# Patient Record
Sex: MALE | Race: WHITE | NOT HISPANIC OR LATINO
[De-identification: names, ages, dates, MRNs, and addresses within clinical notes are randomized per-mention and may not be internally consistent; named-entity substitution may affect disease eponyms.]

---

## 2017-09-22 ENCOUNTER — APPOINTMENT (OUTPATIENT)
Dept: OPHTHALMOLOGY | Facility: CLINIC | Age: 72
End: 2017-09-22
Payer: COMMERCIAL

## 2017-09-22 PROBLEM — Z00.00 ENCOUNTER FOR PREVENTIVE HEALTH EXAMINATION: Status: ACTIVE | Noted: 2017-09-22

## 2017-09-22 PROCEDURE — 99203 OFFICE O/P NEW LOW 30 MIN: CPT

## 2017-10-06 ENCOUNTER — APPOINTMENT (OUTPATIENT)
Dept: OPHTHALMOLOGY | Facility: CLINIC | Age: 72
End: 2017-10-06
Payer: COMMERCIAL

## 2017-10-06 PROCEDURE — 92012 INTRM OPH EXAM EST PATIENT: CPT

## 2017-10-20 ENCOUNTER — RX RENEWAL (OUTPATIENT)
Age: 72
End: 2017-10-20

## 2017-10-20 DIAGNOSIS — H10.13 ACUTE ATOPIC CONJUNCTIVITIS, BILATERAL: ICD-10-CM

## 2017-11-30 ENCOUNTER — APPOINTMENT (OUTPATIENT)
Dept: OPHTHALMOLOGY | Facility: CLINIC | Age: 72
End: 2017-11-30
Payer: MEDICARE

## 2017-11-30 PROCEDURE — 92014 COMPRE OPH EXAM EST PT 1/>: CPT

## 2019-02-15 ENCOUNTER — APPOINTMENT (OUTPATIENT)
Dept: SURGERY | Facility: CLINIC | Age: 74
End: 2019-02-15

## 2019-02-19 ENCOUNTER — OUTPATIENT (OUTPATIENT)
Dept: OUTPATIENT SERVICES | Facility: HOSPITAL | Age: 74
LOS: 1 days | End: 2019-02-19
Payer: MEDICARE

## 2019-02-19 ENCOUNTER — APPOINTMENT (OUTPATIENT)
Dept: BARIATRICS | Facility: CLINIC | Age: 74
End: 2019-02-19
Payer: MEDICARE

## 2019-02-19 VITALS
BODY MASS INDEX: 20.11 KG/M2 | HEART RATE: 75 BPM | DIASTOLIC BLOOD PRESSURE: 79 MMHG | OXYGEN SATURATION: 98 % | WEIGHT: 113.5 LBS | SYSTOLIC BLOOD PRESSURE: 167 MMHG | HEIGHT: 63 IN

## 2019-02-19 DIAGNOSIS — Z78.9 OTHER SPECIFIED HEALTH STATUS: ICD-10-CM

## 2019-02-19 DIAGNOSIS — Z82.49 FAMILY HISTORY OF ISCHEMIC HEART DISEASE AND OTHER DISEASES OF THE CIRCULATORY SYSTEM: ICD-10-CM

## 2019-02-19 DIAGNOSIS — Z72.0 TOBACCO USE: ICD-10-CM

## 2019-02-19 DIAGNOSIS — Z86.79 PERSONAL HISTORY OF OTHER DISEASES OF THE CIRCULATORY SYSTEM: ICD-10-CM

## 2019-02-19 DIAGNOSIS — F17.200 NICOTINE DEPENDENCE, UNSPECIFIED, UNCOMPLICATED: ICD-10-CM

## 2019-02-19 DIAGNOSIS — Z01.818 ENCOUNTER FOR OTHER PREPROCEDURAL EXAMINATION: ICD-10-CM

## 2019-02-19 DIAGNOSIS — Z86.59 PERSONAL HISTORY OF OTHER MENTAL AND BEHAVIORAL DISORDERS: ICD-10-CM

## 2019-02-19 DIAGNOSIS — K46.9 UNSPECIFIED ABDOMINAL HERNIA W/OUT OBSTRUCTION OR GANGRENE: ICD-10-CM

## 2019-02-19 DIAGNOSIS — Z86.39 PERSONAL HISTORY OF OTHER ENDOCRINE, NUTRITIONAL AND METABOLIC DISEASE: ICD-10-CM

## 2019-02-19 LAB
ALBUMIN SERPL ELPH-MCNC: 4.9 G/DL — SIGNIFICANT CHANGE UP (ref 3.3–5)
ALP SERPL-CCNC: 147 U/L — HIGH (ref 40–120)
ALT FLD-CCNC: 45 U/L — SIGNIFICANT CHANGE UP (ref 10–45)
ANION GAP SERPL CALC-SCNC: 11 MMOL/L — SIGNIFICANT CHANGE UP (ref 5–17)
APPEARANCE UR: CLEAR — SIGNIFICANT CHANGE UP
APTT BLD: 37 SEC — HIGH (ref 27.5–36.3)
AST SERPL-CCNC: 59 U/L — HIGH (ref 10–40)
BACTERIA # UR AUTO: SIGNIFICANT CHANGE UP /HPF
BASOPHILS # BLD AUTO: 0.03 K/UL — SIGNIFICANT CHANGE UP (ref 0–0.2)
BASOPHILS NFR BLD AUTO: 0.3 % — SIGNIFICANT CHANGE UP (ref 0–2)
BILIRUB SERPL-MCNC: 0.7 MG/DL — SIGNIFICANT CHANGE UP (ref 0.2–1.2)
BILIRUB UR-MCNC: NEGATIVE — SIGNIFICANT CHANGE UP
BUN SERPL-MCNC: 10 MG/DL — SIGNIFICANT CHANGE UP (ref 7–23)
CALCIUM SERPL-MCNC: 9.9 MG/DL — SIGNIFICANT CHANGE UP (ref 8.4–10.5)
CHLORIDE SERPL-SCNC: 100 MMOL/L — SIGNIFICANT CHANGE UP (ref 96–108)
CO2 SERPL-SCNC: 24 MMOL/L — SIGNIFICANT CHANGE UP (ref 22–31)
COLOR SPEC: YELLOW — SIGNIFICANT CHANGE UP
CREAT SERPL-MCNC: 0.65 MG/DL — SIGNIFICANT CHANGE UP (ref 0.5–1.3)
DIFF PNL FLD: ABNORMAL
EOSINOPHIL # BLD AUTO: 0.03 K/UL — SIGNIFICANT CHANGE UP (ref 0–0.5)
EOSINOPHIL NFR BLD AUTO: 0.3 % — SIGNIFICANT CHANGE UP (ref 0–6)
EPI CELLS # UR: SIGNIFICANT CHANGE UP /HPF (ref 0–5)
GLUCOSE SERPL-MCNC: 145 MG/DL — HIGH (ref 70–99)
GLUCOSE UR QL: NEGATIVE — SIGNIFICANT CHANGE UP
HCT VFR BLD CALC: 48.5 % — SIGNIFICANT CHANGE UP (ref 39–50)
HGB BLD-MCNC: 16.3 G/DL — SIGNIFICANT CHANGE UP (ref 13–17)
IMM GRANULOCYTES NFR BLD AUTO: 0.3 % — SIGNIFICANT CHANGE UP (ref 0–1.5)
INR BLD: 0.99 — SIGNIFICANT CHANGE UP (ref 0.88–1.16)
KETONES UR-MCNC: NEGATIVE — SIGNIFICANT CHANGE UP
LEUKOCYTE ESTERASE UR-ACNC: NEGATIVE — SIGNIFICANT CHANGE UP
LYMPHOCYTES # BLD AUTO: 0.93 K/UL — LOW (ref 1–3.3)
LYMPHOCYTES # BLD AUTO: 10 % — LOW (ref 13–44)
MCHC RBC-ENTMCNC: 33.6 GM/DL — SIGNIFICANT CHANGE UP (ref 32–36)
MCHC RBC-ENTMCNC: 34.5 PG — HIGH (ref 27–34)
MCV RBC AUTO: 102.5 FL — HIGH (ref 80–100)
MONOCYTES # BLD AUTO: 0.66 K/UL — SIGNIFICANT CHANGE UP (ref 0–0.9)
MONOCYTES NFR BLD AUTO: 7.1 % — SIGNIFICANT CHANGE UP (ref 2–14)
NEUTROPHILS # BLD AUTO: 7.65 K/UL — HIGH (ref 1.8–7.4)
NEUTROPHILS NFR BLD AUTO: 82 % — HIGH (ref 43–77)
NITRITE UR-MCNC: NEGATIVE — SIGNIFICANT CHANGE UP
NRBC # BLD: 0 /100 WBCS — SIGNIFICANT CHANGE UP (ref 0–0)
PH UR: 5.5 — SIGNIFICANT CHANGE UP (ref 5–8)
PLATELET # BLD AUTO: 123 K/UL — LOW (ref 150–400)
POTASSIUM SERPL-MCNC: 4.5 MMOL/L — SIGNIFICANT CHANGE UP (ref 3.5–5.3)
POTASSIUM SERPL-SCNC: 4.5 MMOL/L — SIGNIFICANT CHANGE UP (ref 3.5–5.3)
PROT SERPL-MCNC: 8.3 G/DL — SIGNIFICANT CHANGE UP (ref 6–8.3)
PROT UR-MCNC: ABNORMAL MG/DL
PROTHROM AB SERPL-ACNC: 11.2 SEC — SIGNIFICANT CHANGE UP (ref 10–12.9)
RBC # BLD: 4.73 M/UL — SIGNIFICANT CHANGE UP (ref 4.2–5.8)
RBC # FLD: 12.6 % — SIGNIFICANT CHANGE UP (ref 10.3–14.5)
RBC CASTS # UR COMP ASSIST: < 5 /HPF — SIGNIFICANT CHANGE UP
SODIUM SERPL-SCNC: 135 MMOL/L — SIGNIFICANT CHANGE UP (ref 135–145)
SP GR SPEC: 1.01 — SIGNIFICANT CHANGE UP (ref 1–1.03)
UROBILINOGEN FLD QL: 0.2 E.U./DL — SIGNIFICANT CHANGE UP
WBC # BLD: 9.33 K/UL — SIGNIFICANT CHANGE UP (ref 3.8–10.5)
WBC # FLD AUTO: 9.33 K/UL — SIGNIFICANT CHANGE UP (ref 3.8–10.5)
WBC UR QL: < 5 /HPF — SIGNIFICANT CHANGE UP

## 2019-02-19 PROCEDURE — 85730 THROMBOPLASTIN TIME PARTIAL: CPT

## 2019-02-19 PROCEDURE — 85610 PROTHROMBIN TIME: CPT

## 2019-02-19 PROCEDURE — 80053 COMPREHEN METABOLIC PANEL: CPT

## 2019-02-19 PROCEDURE — 87086 URINE CULTURE/COLONY COUNT: CPT

## 2019-02-19 PROCEDURE — 71046 X-RAY EXAM CHEST 2 VIEWS: CPT

## 2019-02-19 PROCEDURE — 71046 X-RAY EXAM CHEST 2 VIEWS: CPT | Mod: 26

## 2019-02-19 PROCEDURE — 81001 URINALYSIS AUTO W/SCOPE: CPT

## 2019-02-19 PROCEDURE — 99205 OFFICE O/P NEW HI 60 MIN: CPT

## 2019-02-19 PROCEDURE — 85025 COMPLETE CBC W/AUTO DIFF WBC: CPT

## 2019-02-19 RX ORDER — TOBRAMYCIN AND DEXAMETHASONE 3; 1 MG/ML; MG/ML
0.3-0.1 SUSPENSION/ DROPS OPHTHALMIC
Qty: 5 | Refills: 0 | Status: ACTIVE | COMMUNITY
Start: 2017-10-20

## 2019-02-21 LAB
CULTURE RESULTS: NO GROWTH — SIGNIFICANT CHANGE UP
SPECIMEN SOURCE: SIGNIFICANT CHANGE UP

## 2019-02-26 ENCOUNTER — RESULT REVIEW (OUTPATIENT)
Age: 74
End: 2019-02-26

## 2019-02-26 ENCOUNTER — OUTPATIENT (OUTPATIENT)
Dept: OUTPATIENT SERVICES | Facility: HOSPITAL | Age: 74
LOS: 1 days | Discharge: HOME | End: 2019-02-26
Payer: MEDICARE

## 2019-02-26 ENCOUNTER — APPOINTMENT (OUTPATIENT)
Dept: SURGERY | Facility: CLINIC | Age: 74
End: 2019-02-26

## 2019-02-26 LAB — GLUCOSE BLDC GLUCOMTR-MCNC: 91 MG/DL — SIGNIFICANT CHANGE UP (ref 70–99)

## 2019-02-26 PROCEDURE — 47562 LAPAROSCOPIC CHOLECYSTECTOMY: CPT

## 2019-02-26 RX ORDER — DOCUSATE SODIUM 100 MG
1 CAPSULE ORAL
Qty: 15 | Refills: 0 | OUTPATIENT
Start: 2019-02-26 | End: 2019-03-02

## 2019-03-01 ENCOUNTER — OTHER (OUTPATIENT)
Age: 74
End: 2019-03-01

## 2019-03-01 LAB — SURGICAL PATHOLOGY STUDY: SIGNIFICANT CHANGE UP

## 2019-03-12 ENCOUNTER — APPOINTMENT (OUTPATIENT)
Dept: BARIATRICS | Facility: CLINIC | Age: 74
End: 2019-03-12

## 2019-03-15 VITALS
HEART RATE: 100 BPM | OXYGEN SATURATION: 99 % | RESPIRATION RATE: 17 BRPM | DIASTOLIC BLOOD PRESSURE: 53 MMHG | SYSTOLIC BLOOD PRESSURE: 136 MMHG | TEMPERATURE: 99 F

## 2019-03-15 LAB
ALBUMIN SERPL ELPH-MCNC: 3.5 G/DL — SIGNIFICANT CHANGE UP (ref 3.3–5)
ALP SERPL-CCNC: 259 U/L — HIGH (ref 40–120)
ALT FLD-CCNC: 28 U/L — SIGNIFICANT CHANGE UP (ref 10–45)
ANION GAP SERPL CALC-SCNC: 11 MMOL/L — SIGNIFICANT CHANGE UP (ref 5–17)
AST SERPL-CCNC: 35 U/L — SIGNIFICANT CHANGE UP (ref 10–40)
BASOPHILS # BLD AUTO: 0.06 K/UL — SIGNIFICANT CHANGE UP (ref 0–0.2)
BASOPHILS NFR BLD AUTO: 0.7 % — SIGNIFICANT CHANGE UP (ref 0–2)
BILIRUB SERPL-MCNC: 0.4 MG/DL — SIGNIFICANT CHANGE UP (ref 0.2–1.2)
BUN SERPL-MCNC: 7 MG/DL — SIGNIFICANT CHANGE UP (ref 7–23)
CALCIUM SERPL-MCNC: 8.6 MG/DL — SIGNIFICANT CHANGE UP (ref 8.4–10.5)
CHLORIDE SERPL-SCNC: 99 MMOL/L — SIGNIFICANT CHANGE UP (ref 96–108)
CO2 SERPL-SCNC: 21 MMOL/L — LOW (ref 22–31)
CREAT SERPL-MCNC: 0.59 MG/DL — SIGNIFICANT CHANGE UP (ref 0.5–1.3)
EOSINOPHIL # BLD AUTO: 0.03 K/UL — SIGNIFICANT CHANGE UP (ref 0–0.5)
EOSINOPHIL NFR BLD AUTO: 0.3 % — SIGNIFICANT CHANGE UP (ref 0–6)
GLUCOSE SERPL-MCNC: 97 MG/DL — SIGNIFICANT CHANGE UP (ref 70–99)
HCT VFR BLD CALC: 31.7 % — LOW (ref 39–50)
HGB BLD-MCNC: 10.4 G/DL — LOW (ref 13–17)
IMM GRANULOCYTES NFR BLD AUTO: 1 % — SIGNIFICANT CHANGE UP (ref 0–1.5)
LIDOCAIN IGE QN: 5 U/L — LOW (ref 7–60)
LYMPHOCYTES # BLD AUTO: 0.58 K/UL — LOW (ref 1–3.3)
LYMPHOCYTES # BLD AUTO: 6.6 % — LOW (ref 13–44)
MCHC RBC-ENTMCNC: 32.8 GM/DL — SIGNIFICANT CHANGE UP (ref 32–36)
MCHC RBC-ENTMCNC: 33.9 PG — SIGNIFICANT CHANGE UP (ref 27–34)
MCV RBC AUTO: 103.3 FL — HIGH (ref 80–100)
MONOCYTES # BLD AUTO: 0.49 K/UL — SIGNIFICANT CHANGE UP (ref 0–0.9)
MONOCYTES NFR BLD AUTO: 5.6 % — SIGNIFICANT CHANGE UP (ref 2–14)
NEUTROPHILS # BLD AUTO: 7.53 K/UL — HIGH (ref 1.8–7.4)
NEUTROPHILS NFR BLD AUTO: 85.8 % — HIGH (ref 43–77)
NRBC # BLD: 0 /100 WBCS — SIGNIFICANT CHANGE UP (ref 0–0)
PLATELET # BLD AUTO: 320 K/UL — SIGNIFICANT CHANGE UP (ref 150–400)
POTASSIUM SERPL-MCNC: 4.3 MMOL/L — SIGNIFICANT CHANGE UP (ref 3.5–5.3)
POTASSIUM SERPL-SCNC: 4.3 MMOL/L — SIGNIFICANT CHANGE UP (ref 3.5–5.3)
PROT SERPL-MCNC: 6.1 G/DL — SIGNIFICANT CHANGE UP (ref 6–8.3)
RBC # BLD: 3.07 M/UL — LOW (ref 4.2–5.8)
RBC # FLD: 14.6 % — HIGH (ref 10.3–14.5)
SODIUM SERPL-SCNC: 131 MMOL/L — LOW (ref 135–145)
WBC # BLD: 8.78 K/UL — SIGNIFICANT CHANGE UP (ref 3.8–10.5)
WBC # FLD AUTO: 8.78 K/UL — SIGNIFICANT CHANGE UP (ref 3.8–10.5)

## 2019-03-15 PROCEDURE — 76705 ECHO EXAM OF ABDOMEN: CPT | Mod: 26

## 2019-03-15 RX ORDER — FAMOTIDINE 10 MG/ML
20 INJECTION INTRAVENOUS ONCE
Qty: 0 | Refills: 0 | Status: COMPLETED | OUTPATIENT
Start: 2019-03-15 | End: 2019-03-15

## 2019-03-15 RX ORDER — IOHEXOL 300 MG/ML
30 INJECTION, SOLUTION INTRAVENOUS ONCE
Qty: 0 | Refills: 0 | Status: COMPLETED | OUTPATIENT
Start: 2019-03-15 | End: 2019-03-15

## 2019-03-15 RX ORDER — MORPHINE SULFATE 50 MG/1
4 CAPSULE, EXTENDED RELEASE ORAL ONCE
Qty: 0 | Refills: 0 | Status: DISCONTINUED | OUTPATIENT
Start: 2019-03-15 | End: 2019-03-15

## 2019-03-15 RX ORDER — SODIUM CHLORIDE 9 MG/ML
1000 INJECTION INTRAMUSCULAR; INTRAVENOUS; SUBCUTANEOUS ONCE
Qty: 0 | Refills: 0 | Status: COMPLETED | OUTPATIENT
Start: 2019-03-15 | End: 2019-03-15

## 2019-03-15 RX ADMIN — MORPHINE SULFATE 4 MILLIGRAM(S): 50 CAPSULE, EXTENDED RELEASE ORAL at 19:57

## 2019-03-15 RX ADMIN — FAMOTIDINE 20 MILLIGRAM(S): 10 INJECTION INTRAVENOUS at 19:57

## 2019-03-15 RX ADMIN — SODIUM CHLORIDE 2000 MILLILITER(S): 9 INJECTION INTRAMUSCULAR; INTRAVENOUS; SUBCUTANEOUS at 19:57

## 2019-03-15 RX ADMIN — IOHEXOL 30 MILLILITER(S): 300 INJECTION, SOLUTION INTRAVENOUS at 19:56

## 2019-03-15 NOTE — CONSULT NOTE ADULT - ASSESSMENT
73 Year old, M, PMH of CAD S/p stent, HTN, DM, S/p Lap tila on 2/26 for symptomatic gall stone, present with abdominal pain, Post lap tila, US shows post cholecystectomy changes, CBD 7mm    Recs:  - CT abdomen/Pelvis with IV/oral contrast  - Seen and examined with the chief  - Discussed with   - Will follow

## 2019-03-15 NOTE — CONSULT NOTE ADULT - SUBJECTIVE AND OBJECTIVE BOX
73 Year old, M, PMH of CAD S/p stent, HTN, DM, S/p Lap tila on 2/26 for symptomatic gall stone, present with abdominal pain, mainly epigastric and RUQ, doesn't radiate, he states he has been having the same pain before surgery and it never resolved, associated with nausea/vomiting, he can tolerate food but sometimes he feel he can't tolerate oral diet, he denies fever, no change in bowel habits, no hematuria or dysuria.      Vital Signs Last 24 Hrs  T(C): 37.2 (15 Mar 2019 17:41), Max: 37.2 (15 Mar 2019 17:41)  T(F): 98.9 (15 Mar 2019 17:41), Max: 98.9 (15 Mar 2019 17:41)  HR: 100 (15 Mar 2019 17:41) (100 - 100)  BP: 136/53 (15 Mar 2019 17:41) (136/53 - 136/53)  BP(mean): --  RR: 17 (15 Mar 2019 17:41) (17 - 17)  SpO2: 99% (15 Mar 2019 17:41) (99% - 99%)    Physical Exam:  General: NAD, resting comfortably in bed  Pulmonary: Nonlabored breathing, no respiratory distress  Abdominal:  4 laparoscopic incision well healed and covered by dermabond  Soft, ND,NT      LABS:                        10.4   8.78  )-----------( 320      ( 15 Mar 2019 18:18 )             31.7     03-15    131<L>  |  99  |  7   ----------------------------<  97  4.3   |  21<L>  |  0.59    Ca    8.6      15 Mar 2019 18:18    TPro  6.1  /  Alb  3.5  /  TBili  0.4  /  DBili  x   /  AST  35  /  ALT  28  /  AlkPhos  259<H>  03-15      CAPILLARY BLOOD GLUCOSE          LIVER FUNCTIONS - ( 15 Mar 2019 18:18 )  Alb: 3.5 g/dL / Pro: 6.1 g/dL / ALK PHOS: 259 U/L / ALT: 28 U/L / AST: 35 U/L / GGT: x

## 2019-03-15 NOTE — ED PROVIDER NOTE - CLINICAL SUMMARY MEDICAL DECISION MAKING FREE TEXT BOX
74 yo M with pmh of DM, CAD s/p stent, HTN s/p cholecystectomy 2/26 c/o RUQ pain since surgery. +n/v. No f/c.  Soft; non-distended; +RUQ ttp, + ecchymosis around umbilical wound, wounds healing well without erythema or drainage

## 2019-03-15 NOTE — ED ADULT TRIAGE NOTE - CHIEF COMPLAINT QUOTE
pt c/o left sided flank/rib pain radiating to the sternum. pt had cholecystectomy 2/26 and now reports pain is increasing, bruising noted on L sided of ribs and abdomen. also c/o intermittent vomiting ever since the surgery and inability to tolerate PO. denies fever/chills, drainage at incision sites.

## 2019-03-15 NOTE — ED PROVIDER NOTE - PHYSICAL EXAMINATION
CONSTITUTIONAL: Well-appearing; well-nourished; in no apparent distress.   HEAD: Normocephalic; atraumatic.   EYES: PERRL; EOM intact; conjunctiva and sclera clear  ENT: normal nose; no rhinorrhea; normal pharynx with no erythema or lesions.   NECK: Supple; non-tender; no LAD  CARDIOVASCULAR: Normal S1, S2; no murmurs, rubs, or gallops. Regular rate and rhythm.   RESPIRATORY: Breathing easily; breath sounds clear and equal bilaterally; no wheezes, rhonchi, or rales.  GI: Soft; non-distended; +RUQ ttp, + ecchymosis around umbilical wound, wounds healing well without erythema or drainage   MSK: FROM at all extremities, normal tone   EXT: No cyanosis or edema; N/V intact  SKIN: Normal for age and race; warm; dry; good turgor; no apparent lesions or rash.   NEURO: A & O x 3; face symmetric; grossly unremarkable.   PSYCHOLOGICAL: The patient’s mood and manner are appropriate.

## 2019-03-15 NOTE — ED ADULT NURSE NOTE - OBJECTIVE STATEMENT
Pt presents with L sided rib/flank pain, radiating to sternum. Pt states he had a cholecystectomy on 2/26 and reports increased pain, bruising and decreased PO intake. Pt reports daily n/v. Pt denies fever/chills, CP or SOB. Incision sites on abd are clean dry and intact. Bruising noted on abd. Pt presents with L sided rib/flank pain, radiating to sternum. Pt states he had a cholecystectomy on 2/26 and reports increased pain, bruising and decreased PO intake. Pt reports daily n/v. Pt denies fever/chills, CP or SOB. Incision sites on abd are clean dry and intact. Bruising noted on abd.    Wife [phone]  Son [phone]

## 2019-03-15 NOTE — ED ADULT NURSE REASSESSMENT NOTE - NS ED NURSE REASSESS COMMENT FT1
Rec'd pt  calm, in no distress, breathing with ease on room air. Pt made aware of plan of care. Pt pending CT and US results. Pt needs met. Safety precautions in place. Close monitoring continues.

## 2019-03-15 NOTE — ED PROVIDER NOTE - ATTENDING CONTRIBUTION TO CARE
I discussed the plan of care of the patient directly with the PA while the patient was in the Emergency Department. He is s/p recent lap cholecystectomy w/ dr. srivastava, here w/ RUQ pain since the surgery. thought initially just post op pain, but not improving at all, and with some intermittent n/v and decreased po intake since the surgery. I did not perform a face to face diagnostic evaluation on this patient. I have reviewed the ACP note and agree with the history, exam and plan of care of labs, US, surgical consult and eval.

## 2019-03-15 NOTE — ED PROVIDER NOTE - OBJECTIVE STATEMENT
72 yo M with pmh of DM, CAD s/p stent, HTN s/p cholecystectomy 2/26 c/o RUQ pain since surgery. Pt states the pain has been constant since the surgery but he  thought it would improve which is why he waited to come in. Associated with intermittent n/v and pt having a hard time eating due to nausea and vomiting. Associated with midsternal chest burning and throat burning. Denies fever, chills, cp, sob, back pain, dysuria. Reports chronic cough.

## 2019-03-16 ENCOUNTER — INPATIENT (INPATIENT)
Facility: HOSPITAL | Age: 74
LOS: 1 days | Discharge: HOME | DRG: 392 | End: 2019-03-18
Attending: GENERAL ACUTE CARE HOSPITAL | Admitting: GENERAL ACUTE CARE HOSPITAL
Payer: MEDICARE

## 2019-03-16 DIAGNOSIS — Z90.49 ACQUIRED ABSENCE OF OTHER SPECIFIED PARTS OF DIGESTIVE TRACT: Chronic | ICD-10-CM

## 2019-03-16 LAB
ALBUMIN SERPL ELPH-MCNC: 3.6 G/DL — SIGNIFICANT CHANGE UP (ref 3.3–5)
ALP SERPL-CCNC: 471 U/L — HIGH (ref 40–120)
ALT FLD-CCNC: 43 U/L — SIGNIFICANT CHANGE UP (ref 10–45)
ANION GAP SERPL CALC-SCNC: 10 MMOL/L — SIGNIFICANT CHANGE UP (ref 5–17)
AST SERPL-CCNC: 68 U/L — HIGH (ref 10–40)
BILIRUB SERPL-MCNC: 0.6 MG/DL — SIGNIFICANT CHANGE UP (ref 0.2–1.2)
BUN SERPL-MCNC: 6 MG/DL — LOW (ref 7–23)
CALCIUM SERPL-MCNC: 9 MG/DL — SIGNIFICANT CHANGE UP (ref 8.4–10.5)
CHLORIDE SERPL-SCNC: 99 MMOL/L — SIGNIFICANT CHANGE UP (ref 96–108)
CO2 SERPL-SCNC: 25 MMOL/L — SIGNIFICANT CHANGE UP (ref 22–31)
CREAT SERPL-MCNC: 0.55 MG/DL — SIGNIFICANT CHANGE UP (ref 0.5–1.3)
GLUCOSE BLDC GLUCOMTR-MCNC: 103 MG/DL — HIGH (ref 70–99)
GLUCOSE BLDC GLUCOMTR-MCNC: 444 MG/DL — HIGH (ref 70–99)
GLUCOSE BLDC GLUCOMTR-MCNC: 479 MG/DL — CRITICAL HIGH (ref 70–99)
GLUCOSE BLDC GLUCOMTR-MCNC: 50 MG/DL — LOW (ref 70–99)
GLUCOSE BLDC GLUCOMTR-MCNC: 533 MG/DL — CRITICAL HIGH (ref 70–99)
GLUCOSE BLDC GLUCOMTR-MCNC: 535 MG/DL — CRITICAL HIGH (ref 70–99)
GLUCOSE BLDC GLUCOMTR-MCNC: 90 MG/DL — SIGNIFICANT CHANGE UP (ref 70–99)
GLUCOSE BLDC GLUCOMTR-MCNC: >600 MG/DL — CRITICAL HIGH (ref 70–99)
GLUCOSE SERPL-MCNC: 47 MG/DL — LOW (ref 70–99)
HBA1C BLD-MCNC: 7.8 % — HIGH (ref 4–5.6)
HCT VFR BLD CALC: 36.9 % — LOW (ref 39–50)
HGB BLD-MCNC: 11.8 G/DL — LOW (ref 13–17)
MAGNESIUM SERPL-MCNC: 1.6 MG/DL — SIGNIFICANT CHANGE UP (ref 1.6–2.6)
MCHC RBC-ENTMCNC: 32 GM/DL — SIGNIFICANT CHANGE UP (ref 32–36)
MCHC RBC-ENTMCNC: 33.6 PG — SIGNIFICANT CHANGE UP (ref 27–34)
MCV RBC AUTO: 105.1 FL — HIGH (ref 80–100)
NRBC # BLD: 0 /100 WBCS — SIGNIFICANT CHANGE UP (ref 0–0)
PHOSPHATE SERPL-MCNC: 3.2 MG/DL — SIGNIFICANT CHANGE UP (ref 2.5–4.5)
PLATELET # BLD AUTO: 303 K/UL — SIGNIFICANT CHANGE UP (ref 150–400)
POTASSIUM SERPL-MCNC: 4.5 MMOL/L — SIGNIFICANT CHANGE UP (ref 3.5–5.3)
POTASSIUM SERPL-SCNC: 4.5 MMOL/L — SIGNIFICANT CHANGE UP (ref 3.5–5.3)
PROT SERPL-MCNC: 6.6 G/DL — SIGNIFICANT CHANGE UP (ref 6–8.3)
RBC # BLD: 3.51 M/UL — LOW (ref 4.2–5.8)
RBC # FLD: 14.7 % — HIGH (ref 10.3–14.5)
SODIUM SERPL-SCNC: 134 MMOL/L — LOW (ref 135–145)
WBC # BLD: 4.92 K/UL — SIGNIFICANT CHANGE UP (ref 3.8–10.5)
WBC # FLD AUTO: 4.92 K/UL — SIGNIFICANT CHANGE UP (ref 3.8–10.5)

## 2019-03-16 PROCEDURE — 74178 CT ABD&PLV WO CNTR FLWD CNTR: CPT | Mod: 26

## 2019-03-16 PROCEDURE — 99285 EMERGENCY DEPT VISIT HI MDM: CPT

## 2019-03-16 RX ORDER — SODIUM CHLORIDE 9 MG/ML
1000 INJECTION, SOLUTION INTRAVENOUS
Qty: 0 | Refills: 0 | Status: DISCONTINUED | OUTPATIENT
Start: 2019-03-16 | End: 2019-03-18

## 2019-03-16 RX ORDER — NICOTINE POLACRILEX 2 MG
1 GUM BUCCAL DAILY
Qty: 0 | Refills: 0 | Status: DISCONTINUED | OUTPATIENT
Start: 2019-03-16 | End: 2019-03-18

## 2019-03-16 RX ORDER — SODIUM CHLORIDE 9 MG/ML
1000 INJECTION, SOLUTION INTRAVENOUS
Qty: 0 | Refills: 0 | Status: DISCONTINUED | OUTPATIENT
Start: 2019-03-16 | End: 2019-03-16

## 2019-03-16 RX ORDER — INSULIN LISPRO 100/ML
VIAL (ML) SUBCUTANEOUS
Qty: 0 | Refills: 0 | Status: DISCONTINUED | OUTPATIENT
Start: 2019-03-16 | End: 2019-03-16

## 2019-03-16 RX ORDER — INSULIN GLARGINE 100 [IU]/ML
0 INJECTION, SOLUTION SUBCUTANEOUS
Qty: 0 | Refills: 0 | COMMUNITY

## 2019-03-16 RX ORDER — DEXTROSE 50 % IN WATER 50 %
12.5 SYRINGE (ML) INTRAVENOUS ONCE
Qty: 0 | Refills: 0 | Status: DISCONTINUED | OUTPATIENT
Start: 2019-03-16 | End: 2019-03-18

## 2019-03-16 RX ORDER — GLUCAGON INJECTION, SOLUTION 0.5 MG/.1ML
1 INJECTION, SOLUTION SUBCUTANEOUS ONCE
Qty: 0 | Refills: 0 | Status: DISCONTINUED | OUTPATIENT
Start: 2019-03-16 | End: 2019-03-18

## 2019-03-16 RX ORDER — OMEPRAZOLE 10 MG/1
1 CAPSULE, DELAYED RELEASE ORAL
Qty: 0 | Refills: 0 | COMMUNITY

## 2019-03-16 RX ORDER — HEPARIN SODIUM 5000 [USP'U]/ML
5000 INJECTION INTRAVENOUS; SUBCUTANEOUS EVERY 8 HOURS
Qty: 0 | Refills: 0 | Status: DISCONTINUED | OUTPATIENT
Start: 2019-03-16 | End: 2019-03-16

## 2019-03-16 RX ORDER — MAGNESIUM SULFATE 500 MG/ML
2 VIAL (ML) INJECTION ONCE
Qty: 0 | Refills: 0 | Status: COMPLETED | OUTPATIENT
Start: 2019-03-16 | End: 2019-03-16

## 2019-03-16 RX ORDER — BENZOCAINE AND MENTHOL 5; 1 G/100ML; G/100ML
1 LIQUID ORAL ONCE
Qty: 0 | Refills: 0 | Status: COMPLETED | OUTPATIENT
Start: 2019-03-16 | End: 2019-03-16

## 2019-03-16 RX ORDER — RAMIPRIL 5 MG
0 CAPSULE ORAL
Qty: 0 | Refills: 0 | COMMUNITY

## 2019-03-16 RX ORDER — INSULIN LISPRO 100/ML
VIAL (ML) SUBCUTANEOUS
Qty: 0 | Refills: 0 | Status: DISCONTINUED | OUTPATIENT
Start: 2019-03-16 | End: 2019-03-17

## 2019-03-16 RX ORDER — DEXTROSE 50 % IN WATER 50 %
12.5 SYRINGE (ML) INTRAVENOUS ONCE
Qty: 0 | Refills: 0 | Status: COMPLETED | OUTPATIENT
Start: 2019-03-16 | End: 2019-03-16

## 2019-03-16 RX ORDER — DEXTROSE 50 % IN WATER 50 %
50 SYRINGE (ML) INTRAVENOUS ONCE
Qty: 0 | Refills: 0 | Status: COMPLETED | OUTPATIENT
Start: 2019-03-16 | End: 2019-03-16

## 2019-03-16 RX ORDER — DEXTROSE 50 % IN WATER 50 %
15 SYRINGE (ML) INTRAVENOUS ONCE
Qty: 0 | Refills: 0 | Status: DISCONTINUED | OUTPATIENT
Start: 2019-03-16 | End: 2019-03-18

## 2019-03-16 RX ORDER — ATORVASTATIN CALCIUM 80 MG/1
1 TABLET, FILM COATED ORAL
Qty: 0 | Refills: 0 | COMMUNITY

## 2019-03-16 RX ORDER — METOPROLOL TARTRATE 50 MG
0 TABLET ORAL
Qty: 0 | Refills: 0 | COMMUNITY

## 2019-03-16 RX ORDER — INSULIN GLARGINE 100 [IU]/ML
100 INJECTION, SOLUTION SUBCUTANEOUS AT BEDTIME
Qty: 0 | Refills: 0 | Status: DISCONTINUED | OUTPATIENT
Start: 2019-03-16 | End: 2019-03-16

## 2019-03-16 RX ORDER — INSULIN GLARGINE 100 [IU]/ML
10 INJECTION, SOLUTION SUBCUTANEOUS AT BEDTIME
Qty: 0 | Refills: 0 | Status: DISCONTINUED | OUTPATIENT
Start: 2019-03-16 | End: 2019-03-18

## 2019-03-16 RX ORDER — HEPARIN SODIUM 5000 [USP'U]/ML
5000 INJECTION INTRAVENOUS; SUBCUTANEOUS EVERY 8 HOURS
Qty: 0 | Refills: 0 | Status: DISCONTINUED | OUTPATIENT
Start: 2019-03-16 | End: 2019-03-18

## 2019-03-16 RX ORDER — DOCUSATE SODIUM 100 MG
100 CAPSULE ORAL THREE TIMES A DAY
Qty: 0 | Refills: 0 | Status: DISCONTINUED | OUTPATIENT
Start: 2019-03-16 | End: 2019-03-18

## 2019-03-16 RX ORDER — ONDANSETRON 8 MG/1
4 TABLET, FILM COATED ORAL EVERY 6 HOURS
Qty: 0 | Refills: 0 | Status: DISCONTINUED | OUTPATIENT
Start: 2019-03-16 | End: 2019-03-18

## 2019-03-16 RX ORDER — DEXTROSE 50 % IN WATER 50 %
25 SYRINGE (ML) INTRAVENOUS ONCE
Qty: 0 | Refills: 0 | Status: DISCONTINUED | OUTPATIENT
Start: 2019-03-16 | End: 2019-03-18

## 2019-03-16 RX ORDER — INSULIN GLARGINE 100 [IU]/ML
50 INJECTION, SOLUTION SUBCUTANEOUS ONCE
Qty: 0 | Refills: 0 | Status: DISCONTINUED | OUTPATIENT
Start: 2019-03-16 | End: 2019-03-16

## 2019-03-16 RX ORDER — ASPIRIN/CALCIUM CARB/MAGNESIUM 324 MG
1 TABLET ORAL
Qty: 0 | Refills: 0 | COMMUNITY

## 2019-03-16 RX ORDER — SENNA PLUS 8.6 MG/1
2 TABLET ORAL AT BEDTIME
Qty: 0 | Refills: 0 | Status: DISCONTINUED | OUTPATIENT
Start: 2019-03-16 | End: 2019-03-18

## 2019-03-16 RX ADMIN — Medication 50 MILLILITER(S): at 01:27

## 2019-03-16 RX ADMIN — Medication 12.5 GRAM(S): at 08:04

## 2019-03-16 RX ADMIN — Medication 100 GRAM(S): at 13:26

## 2019-03-16 RX ADMIN — HEPARIN SODIUM 5000 UNIT(S): 5000 INJECTION INTRAVENOUS; SUBCUTANEOUS at 21:01

## 2019-03-16 RX ADMIN — Medication 1 PATCH: at 13:58

## 2019-03-16 RX ADMIN — Medication 1 PATCH: at 19:02

## 2019-03-16 RX ADMIN — HEPARIN SODIUM 5000 UNIT(S): 5000 INJECTION INTRAVENOUS; SUBCUTANEOUS at 06:51

## 2019-03-16 RX ADMIN — SODIUM CHLORIDE 120 MILLILITER(S): 9 INJECTION, SOLUTION INTRAVENOUS at 01:59

## 2019-03-16 RX ADMIN — BENZOCAINE AND MENTHOL 1 LOZENGE: 5; 1 LIQUID ORAL at 13:26

## 2019-03-16 RX ADMIN — MORPHINE SULFATE 4 MILLIGRAM(S): 50 CAPSULE, EXTENDED RELEASE ORAL at 01:26

## 2019-03-16 RX ADMIN — HEPARIN SODIUM 5000 UNIT(S): 5000 INJECTION INTRAVENOUS; SUBCUTANEOUS at 13:26

## 2019-03-16 NOTE — CHART NOTE - NSCHARTNOTEFT_GEN_A_CORE
PROCEDURE NOTE    Procedure: incision and drainage of hematoma  Resident: [place]  Attending: Sakshi    Patient positioned in supine position. Site prepped with chlorhexidine. #11 blade scalpel was used for 5mm incision to the right lateral port site over site of obvious swelling. Drained approximately 3-5cc of hematoma from site. No packing was placed. Dressed with 4x4 and paper tape. No complications.

## 2019-03-16 NOTE — PROGRESS NOTE ADULT - ASSESSMENT
73M PMH CAD S/p stent, HTN, DM, s/p Lap tila on 2/26 for symptomatic gallstone, a/w abdominal pain and fluid collection in RUQ.     pain/nausea control  IS/OOB  NPO, IVF  ISS  SCDs, HSQ 73M PMH CAD S/p stent, HTN, DM, s/p Lap tila on 2/26 for symptomatic gallstone, a/w abdominal pain and fluid collection in RUQ.     pain/nausea control  IS/OOB  NPO, IVF; advance to low fat diet and monitor tolerance  ISS  SCDs, HSQ  MRCP to evaluate fluid collection  consult GI for recommendations

## 2019-03-16 NOTE — H&P ADULT - ASSESSMENT
73 Year old, M, PMH of CAD S/p stent, HTN, DM, S/p Lap tila on 2/26 for symptomatic gall stone, present with abdominal pain, Post lap tila, US shows post cholecystectomy changes, CBD 7mm. Prelim CT read as normal post operative changes with L bowel containing inguinal hernia that contrast passes    -Admit to [facility] Dr Cantor  -Nausea control PRn, Holding Pain control  -IVF  -NPO  -ISS  -No ABX  -MRCP in am  -SCD, SQH, OOB  -Pt seen by chief resident, Discussed with Dr Cantor and Chief Resident

## 2019-03-16 NOTE — PROGRESS NOTE ADULT - SUBJECTIVE AND OBJECTIVE BOX
SUBJECTIVE: This morning, he feels well; his pain is well-controlled. No nausea or vomiting. No acute complaints.    heparin  Injectable 5000 Unit(s) SubCutaneous every 8 hours      Vital Signs Last 24 Hrs  T(C): 36.9 (16 Mar 2019 05:44), Max: 37.2 (15 Mar 2019 17:41)  T(F): 98.4 (16 Mar 2019 05:44), Max: 98.9 (15 Mar 2019 17:41)  HR: 75 (16 Mar 2019 05:44) (75 - 100)  BP: 113/72 (16 Mar 2019 05:44) (113/72 - 160/83)  BP(mean): --  RR: 17 (16 Mar 2019 05:44) (17 - 18)  SpO2: 100% (16 Mar 2019 05:44) (98% - 100%)  I&O's Detail    15 Mar 2019 07:01  -  16 Mar 2019 07:00  --------------------------------------------------------  IN:    lactated ringers.: 720 mL  Total IN: 720 mL    OUT:    Voided: 500 mL  Total OUT: 500 mL    Total NET: 220 mL      16 Mar 2019 07:01  -  16 Mar 2019 08:33  --------------------------------------------------------  IN:    lactated ringers.: 120 mL  Total IN: 120 mL    OUT:    Voided: 400 mL  Total OUT: 400 mL    Total NET: -280 mL          General: NAD, resting comfortably in bed  Pulm: Nonlabored breathing, no respiratory distress  Abd: soft, mild RUQ tenderness, palpable nodule in RUQ, nondistended  Extrem: WWP, no edema, SCDs in place, no calf tenderness        LABS:                        10.4   8.78  )-----------( 320      ( 15 Mar 2019 18:18 )             31.7     03-15    131<L>  |  99  |  7   ----------------------------<  97  4.3   |  21<L>  |  0.59    Ca    8.6      15 Mar 2019 18:18    TPro  6.1  /  Alb  3.5  /  TBili  0.4  /  DBili  x   /  AST  35  /  ALT  28  /  AlkPhos  259<H>  03-15

## 2019-03-16 NOTE — H&P ADULT - HISTORY OF PRESENT ILLNESS
73 Year old, M, PMH of CAD S/p stent, HTN, DM, S/p Lap tila on 2/26 for symptomatic gall stone, present with abdominal pain, mainly epigastric and RUQ, doesn't radiate, he states he has been having the same pain before surgery and it never resolved, associated with nausea/vomiting, he can tolerate food but sometimes he feel he can't tolerate oral diet, he denies fever, no change in bowel habits, no hematuria or dysuria.

## 2019-03-16 NOTE — H&P ADULT - NSHPLABSRESULTS_GEN_ALL_CORE
10.4   8.78  )-----------( 320      ( 15 Mar 2019 18:18 )             31.7   03-15    131<L>  |  99  |  7   ----------------------------<  97  4.3   |  21<L>  |  0.59    Ca    8.6      15 Mar 2019 18:18    TPro  6.1  /  Alb  3.5  /  TBili  0.4  /  DBili  x   /  AST  35  /  ALT  28  /  AlkPhos  259<H>  03-15  < from: US Abdomen Limited (03.15.19 @ 21:56) >    FINDINGS:     Liver: Increased echogenicity with no visible focal abnormality.    Intrahepatic ducts: Dilated, which could be related to cholecystectomy.    Common bile duct: Normal diameter, measuring 0.7 cm.    Gallbladder: Post cholecystectomy.    Pancreas: The visualized portions were normal in appearance.      Abdominal aorta: No abdominal aortic aneurysm is seen.    Inferior vena cava: The visualized portions were normal in appearance.    Right kidney: Length of 10.3 cm. Pelvic fullness versus mild   hydronephrosis. Normal echogenicity. No focal lesions.       IMPRESSION:  Post cholecystectomy. No dilation of the CBD. Intrahepatic biliary   dilatation, which could be related to cholecystectomy.    Right kidney pelvic fullness versus mild hydronephrosis.    < end of copied text >

## 2019-03-16 NOTE — H&P ADULT - NSHPPHYSICALEXAM_GEN_ALL_CORE
Vital Signs Last 24 Hrs  T(C): 36.9 (15 Mar 2019 23:47), Max: 37.2 (15 Mar 2019 17:41)  T(F): 98.4 (15 Mar 2019 23:47), Max: 98.9 (15 Mar 2019 17:41)  HR: 85 (15 Mar 2019 23:47) (85 - 100)  BP: 152/75 (15 Mar 2019 23:47) (136/53 - 152/75)  BP(mean): --  RR: 17 (15 Mar 2019 23:47) (17 - 17)  SpO2: 98% (15 Mar 2019 23:47) (98% - 99%)    General: NAD, resting comfortably in bed  Pulmonary: Nonlabored breathing, no respiratory distress  Abdominal:  4 laparoscopic incision well healed and covered by dermabond  Soft, ND,NT

## 2019-03-17 DIAGNOSIS — B35.1 TINEA UNGUIUM: ICD-10-CM

## 2019-03-17 DIAGNOSIS — E11.9 TYPE 2 DIABETES MELLITUS WITHOUT COMPLICATIONS: ICD-10-CM

## 2019-03-17 DIAGNOSIS — R13.10 DYSPHAGIA, UNSPECIFIED: ICD-10-CM

## 2019-03-17 LAB
ALBUMIN SERPL ELPH-MCNC: 2.5 G/DL — LOW (ref 3.3–5)
ALP SERPL-CCNC: 351 U/L — HIGH (ref 40–120)
ALT FLD-CCNC: 52 U/L — HIGH (ref 10–45)
ANION GAP SERPL CALC-SCNC: 7 MMOL/L — SIGNIFICANT CHANGE UP (ref 5–17)
AST SERPL-CCNC: 107 U/L — HIGH (ref 10–40)
BILIRUB SERPL-MCNC: 0.3 MG/DL — SIGNIFICANT CHANGE UP (ref 0.2–1.2)
BUN SERPL-MCNC: 13 MG/DL — SIGNIFICANT CHANGE UP (ref 7–23)
CALCIUM SERPL-MCNC: 7.8 MG/DL — LOW (ref 8.4–10.5)
CHLORIDE SERPL-SCNC: 100 MMOL/L — SIGNIFICANT CHANGE UP (ref 96–108)
CO2 SERPL-SCNC: 23 MMOL/L — SIGNIFICANT CHANGE UP (ref 22–31)
CREAT SERPL-MCNC: 0.58 MG/DL — SIGNIFICANT CHANGE UP (ref 0.5–1.3)
GGT SERPL-CCNC: 395 U/L — HIGH (ref 9–50)
GLUCOSE BLDC GLUCOMTR-MCNC: 103 MG/DL — HIGH (ref 70–99)
GLUCOSE BLDC GLUCOMTR-MCNC: 208 MG/DL — HIGH (ref 70–99)
GLUCOSE BLDC GLUCOMTR-MCNC: 213 MG/DL — HIGH (ref 70–99)
GLUCOSE BLDC GLUCOMTR-MCNC: 237 MG/DL — HIGH (ref 70–99)
GLUCOSE BLDC GLUCOMTR-MCNC: 336 MG/DL — HIGH (ref 70–99)
GLUCOSE BLDC GLUCOMTR-MCNC: 397 MG/DL — HIGH (ref 70–99)
GLUCOSE BLDC GLUCOMTR-MCNC: 67 MG/DL — LOW (ref 70–99)
GLUCOSE BLDC GLUCOMTR-MCNC: 70 MG/DL — SIGNIFICANT CHANGE UP (ref 70–99)
GLUCOSE SERPL-MCNC: 92 MG/DL — SIGNIFICANT CHANGE UP (ref 70–99)
HCT VFR BLD CALC: 25.8 % — LOW (ref 39–50)
HGB BLD-MCNC: 8.5 G/DL — LOW (ref 13–17)
MAGNESIUM SERPL-MCNC: 1.4 MG/DL — LOW (ref 1.6–2.6)
MCHC RBC-ENTMCNC: 32.9 GM/DL — SIGNIFICANT CHANGE UP (ref 32–36)
MCHC RBC-ENTMCNC: 34.4 PG — HIGH (ref 27–34)
MCV RBC AUTO: 104.5 FL — HIGH (ref 80–100)
NRBC # BLD: 0 /100 WBCS — SIGNIFICANT CHANGE UP (ref 0–0)
PHOSPHATE SERPL-MCNC: 2.9 MG/DL — SIGNIFICANT CHANGE UP (ref 2.5–4.5)
PLATELET # BLD AUTO: 217 K/UL — SIGNIFICANT CHANGE UP (ref 150–400)
POTASSIUM SERPL-MCNC: 4 MMOL/L — SIGNIFICANT CHANGE UP (ref 3.5–5.3)
POTASSIUM SERPL-SCNC: 4 MMOL/L — SIGNIFICANT CHANGE UP (ref 3.5–5.3)
PROT SERPL-MCNC: 4.7 G/DL — LOW (ref 6–8.3)
RBC # BLD: 2.47 M/UL — LOW (ref 4.2–5.8)
RBC # FLD: 14.4 % — SIGNIFICANT CHANGE UP (ref 10.3–14.5)
SODIUM SERPL-SCNC: 130 MMOL/L — LOW (ref 135–145)
WBC # BLD: 5.23 K/UL — SIGNIFICANT CHANGE UP (ref 3.8–10.5)
WBC # FLD AUTO: 5.23 K/UL — SIGNIFICANT CHANGE UP (ref 3.8–10.5)

## 2019-03-17 PROCEDURE — 99222 1ST HOSP IP/OBS MODERATE 55: CPT | Mod: GC

## 2019-03-17 RX ORDER — INSULIN LISPRO 100/ML
VIAL (ML) SUBCUTANEOUS
Qty: 0 | Refills: 0 | Status: DISCONTINUED | OUTPATIENT
Start: 2019-03-17 | End: 2019-03-18

## 2019-03-17 RX ORDER — MAGNESIUM SULFATE 500 MG/ML
2 VIAL (ML) INJECTION
Qty: 0 | Refills: 0 | Status: COMPLETED | OUTPATIENT
Start: 2019-03-17 | End: 2019-03-17

## 2019-03-17 RX ORDER — FAMOTIDINE 10 MG/ML
20 INJECTION INTRAVENOUS ONCE
Qty: 0 | Refills: 0 | Status: COMPLETED | OUTPATIENT
Start: 2019-03-17 | End: 2019-03-17

## 2019-03-17 RX ADMIN — INSULIN GLARGINE 10 UNIT(S): 100 INJECTION, SOLUTION SUBCUTANEOUS at 00:12

## 2019-03-17 RX ADMIN — Medication 1 PATCH: at 07:18

## 2019-03-17 RX ADMIN — HEPARIN SODIUM 5000 UNIT(S): 5000 INJECTION INTRAVENOUS; SUBCUTANEOUS at 21:22

## 2019-03-17 RX ADMIN — FAMOTIDINE 20 MILLIGRAM(S): 10 INJECTION INTRAVENOUS at 21:22

## 2019-03-17 RX ADMIN — Medication 1 PATCH: at 11:46

## 2019-03-17 RX ADMIN — Medication 10: at 00:57

## 2019-03-17 RX ADMIN — Medication 4: at 22:31

## 2019-03-17 RX ADMIN — HEPARIN SODIUM 5000 UNIT(S): 5000 INJECTION INTRAVENOUS; SUBCUTANEOUS at 13:58

## 2019-03-17 RX ADMIN — Medication 4: at 02:07

## 2019-03-17 RX ADMIN — Medication 8: at 12:31

## 2019-03-17 RX ADMIN — HEPARIN SODIUM 5000 UNIT(S): 5000 INJECTION INTRAVENOUS; SUBCUTANEOUS at 05:05

## 2019-03-17 RX ADMIN — Medication 4: at 17:08

## 2019-03-17 RX ADMIN — INSULIN GLARGINE 10 UNIT(S): 100 INJECTION, SOLUTION SUBCUTANEOUS at 21:22

## 2019-03-17 RX ADMIN — SODIUM CHLORIDE 90 MILLILITER(S): 9 INJECTION, SOLUTION INTRAVENOUS at 11:46

## 2019-03-17 RX ADMIN — Medication 50 GRAM(S): at 16:32

## 2019-03-17 RX ADMIN — Medication 1 PATCH: at 13:25

## 2019-03-17 RX ADMIN — Medication 50 GRAM(S): at 13:58

## 2019-03-17 NOTE — CONSULT NOTE ADULT - SUBJECTIVE AND OBJECTIVE BOX
HPI:  73 Year old, M, PMH of CAD S/p stent, HTN, DM, S/p Lap tila on 2/26 for symptomatic gall stone, present with abdominal pain, mainly epigastric and RUQ, doesn't radiate, he states he has been having the same pain before surgery and it never resolved, associated with nausea/vomiting, he can tolerate food but sometimes he feel he can't tolerate oral diet, he denies fever, no change in bowel habits, no hematuria or dysuria. (16 Mar 2019 01:45)    Allergies    No Known Allergies    Intolerances      Home Medications:  aspirin 81 mg oral tablet, chewable: 1 tab(s) orally once a day (16 Mar 2019 23:37)  atorvastatin 10 mg oral tablet: 1 tab(s) orally once a day (16 Mar 2019 23:37)  Lantus: 10 unit(s) subcutaneous once a day (at bedtime) (16 Mar 2019 23:37)  metFORMIN:  (16 Mar 2019 23:37)  metoprolol tartrate 25 mg oral tablet:  (16 Mar 2019 23:37)  omeprazole 20 mg oral delayed release capsule: 1 cap(s) orally once a day (16 Mar 2019 23:37)  ramipril:  (16 Mar 2019 23:37)    MEDICATIONS:  MEDICATIONS  (STANDING):  dextrose 5%. 1000 milliLiter(s) (50 mL/Hr) IV Continuous <Continuous>  dextrose 50% Injectable 12.5 Gram(s) IV Push once  dextrose 50% Injectable 25 Gram(s) IV Push once  dextrose 50% Injectable 25 Gram(s) IV Push once  heparin  Injectable 5000 Unit(s) SubCutaneous every 8 hours  insulin glargine Injectable (LANTUS) 10 Unit(s) SubCutaneous at bedtime  insulin lispro (HumaLOG) corrective regimen sliding scale   SubCutaneous every 2 hours  lactated ringers. 1000 milliLiter(s) (90 mL/Hr) IV Continuous <Continuous>  nicotine - 21 mG/24Hr(s) Patch 1 patch Transdermal daily    MEDICATIONS  (PRN):  dextrose 40% Gel 15 Gram(s) Oral once PRN Blood Glucose LESS THAN 70 milliGRAM(s)/deciliter  docusate sodium 100 milliGRAM(s) Oral three times a day PRN Constipation  glucagon  Injectable 1 milliGRAM(s) IntraMuscular once PRN Glucose LESS THAN 70 milligrams/deciliter  ondansetron Injectable 4 milliGRAM(s) IV Push every 6 hours PRN Nausea and/or Vomiting  senna 2 Tablet(s) Oral at bedtime PRN Constipation    PAST MEDICAL & SURGICAL HISTORY:  Pancreatitis  Diabetes  Emphysema of lung  History of cholecystectomy    FAMILY HISTORY:    SOCIAL HISTORY:  [place]: [ ] Current, [ ] Former, [ ] Never; Pack Years:  Alcohol:  Illicit Drugs:    REVIEW OF SYSTEMS:  CONSTITUTIONAL: No weakness, fevers or chills  HEENT: No visual changes; No vertigo or throat pain   NECK: No pain or stiffness  RESPIRATORY: No cough, wheezing, hemoptysis; No shortness of breath  CARDIOVASCULAR: No chest pain or palpitations  GASTROINTESTINAL: No abdominal or epigastric pain. No nausea, vomiting, or hematemesis; No diarrhea or constipation. No melena or hematochezia.  GENITOURINARY: No dysuria, frequency or hematuria  NEUROLOGICAL: No numbness or weakness  SKIN: No itching, burning, rashes, or lesions   All other 10 review of systems is negative unless indicated above.    Vital Signs Last 24 Hrs  T(C): 37.1 (17 Mar 2019 09:26), Max: 37.2 (17 Mar 2019 05:09)  T(F): 98.8 (17 Mar 2019 09:26), Max: 99 (17 Mar 2019 05:09)  HR: 98 (17 Mar 2019 09:26) (86 - 98)  BP: 128/74 (17 Mar 2019 09:26) (112/66 - 144/71)  BP(mean): --  RR: 18 (17 Mar 2019 09:26) (17 - 18)  SpO2: 99% (17 Mar 2019 09:26) (99% - 100%)    03-16 @ 07:01  -  03-17 @ 07:00  --------------------------------------------------------  IN: 2600 mL / OUT: 750 mL / NET: 1850 mL        PHYSICAL EXAM:    General: Well developed; well nourished; in no acute distress  Eyes: Anicteric sclerae, moist conjunctivae  HENT: Moist mucous membranes  Neck: Trachea midline, supple  Lungs: Normal respiratory effors and no intercostal retractions  Cardiovascular: RRR  Abdomen: Soft, non-tender non-distended; Normal bowel sounds; No rebound or guarding  Extremities: Normal range of motion, No clubbing, cyanosis or edema  Neurological: Alert and oriented x3  Skin: Warm and dry. No obvious rash    LABS:                        8.5    5.23  )-----------( 217      ( 17 Mar 2019 07:48 )             25.8     03-17    130<L>  |  100  |  13  ----------------------------<  92  4.0   |  23  |  0.58    Ca    7.8<L>      17 Mar 2019 07:48  Phos  2.9     03-17  Mg     1.4     03-17    TPro  4.7<L>  /  Alb  2.5<L>  /  TBili  0.3  /  DBili  x   /  AST  107<H>  /  ALT  52<H>  /  AlkPhos  351<H>  03-17            RADIOLOGY & ADDITIONAL STUDIES:

## 2019-03-17 NOTE — PROGRESS NOTE ADULT - SUBJECTIVE AND OBJECTIVE BOX
SUBJECTIVE: This morning, he feels well; his pain is well-controlled. No nausea or vomiting. No acute complaints. Alk phos remains elevated.    heparin  Injectable 5000 Unit(s) SubCutaneous every 8 hours    Vital Signs Last 24 Hrs  T(C): 37.1 (17 Mar 2019 09:26), Max: 37.2 (17 Mar 2019 05:09)  T(F): 98.8 (17 Mar 2019 09:26), Max: 99 (17 Mar 2019 05:09)  HR: 98 (17 Mar 2019 09:26) (86 - 98)  BP: 128/74 (17 Mar 2019 09:26) (112/66 - 144/71)  BP(mean): --  RR: 18 (17 Mar 2019 09:26) (17 - 18)  SpO2: 99% (17 Mar 2019 09:26) (99% - 100%)    I&O's Summary    16 Mar 2019 07:01  -  17 Mar 2019 07:00  --------------------------------------------------------  IN: 2600 mL / OUT: 750 mL / NET: 1850 mL    17 Mar 2019 07:01  -  17 Mar 2019 12:03  --------------------------------------------------------  IN: 580 mL / OUT: 0 mL / NET: 580 mL        General: NAD, resting comfortably in bed  Pulm: Nonlabored breathing, no respiratory distress  Abd: soft, mild RUQ tenderness, palpable nodule in RUQ, nondistended  Extrem: WWP, no edema, SCDs in place, no calf tenderness                          8.5    5.23  )-----------( 217      ( 17 Mar 2019 07:48 )             25.8     03-17    130<L>  |  100  |  13  ----------------------------<  92  4.0   |  23  |  0.58    Ca    7.8<L>      17 Mar 2019 07:48  Phos  2.9     03-17  Mg     1.4     03-17    TPro  4.7<L>  /  Alb  2.5<L>  /  TBili  0.3  /  DBili  x   /  AST  107<H>  /  ALT  52<H>  /  AlkPhos  351<H>  03-17

## 2019-03-17 NOTE — CONSULT NOTE ADULT - ASSESSMENT
73 year old male PMH of CAD with stent (over 10 years ago, per patient stent is no longer patent and had procedures to try to open it up but no is undergoing medical management), HTN, DM, GERD, emphysema, L hernia surgery, lap tila on 2/26 for symptomatic gallstone, who presented with abdominal pain, mainly epigastric, RUQ and RLQ without radiation fond to have fluid collection going for MRCP tomorrow.

## 2019-03-17 NOTE — PROGRESS NOTE ADULT - ASSESSMENT
73M PMH CAD S/p stent, HTN, DM, s/p Lap tila on 2/26 for symptomatic gallstone, a/w abdominal pain and fluid collection in RUQ.     pain/nausea control  IS/OOB  NPO, IVF; advance to low fat diet and monitor tolerance  ISS  SCDs, HSQ  MRCP to evaluate fluid collection  f/u GI recommendations  consult medicine for diabetes management and general care

## 2019-03-17 NOTE — CONSULT NOTE ADULT - SUBJECTIVE AND OBJECTIVE BOX
INTERNAL MEDICINE SERVICE INITIAL CONSULT NOTE    HPI:  73 year old male PMH of CAD with stent (over 10 years ago, per patient stent is no longer patent and had procedures to try to open it up but no is undergoing medical mangement), HTN, DM, GERD, emphysema, L hernia surgery, lap tila on 2/26 for symptomatic gallstone, who presented with abdominal pain, mainly epigastric, RUQ and RLQ without radiation.  He also states that the pain was worse with standing (resolved with sitting or laying down) and described as dull, 10/10 with 4 episodes of NBNB vomiting and more frequent bowel movements but not diarrhea.  He has been able to tolerate food.  He states that he called Dr. Cantor's office to try to see him and the quickest way to do so so he presented to the ED.  Patient fodn to have fluid collection in RUQ, going for MRCP 3/18.  Additionally, the patient states that he has had 2 weeks of sore throat with difficulty swallowing and bilateral fungus on both of his feet.      REVIEW OF SYSTEMS:   Dull epigastric and right sided abdominal pain, dysphagia, bilateral foot fungus    PAST MEDICAL HISTORY: CAD with stent (over 10 years ago, per patient stent is no longer patent and had procedures to try to open it up but no is undergoing medical management), HTN, DM, GERD, emphysema    PAST SURGICAL HISTORY:  L hernia surgery, lap tila on 2/26 for symptomatic gallstone    FAMILY HISTORY: Colon CA in mother    SOCIAL HISTORY:  Tobacco use: 1PPD smoker for 45 years  EtOH use: 2 12oz beers every other day  Illicit drug use: Denies    MEDICATIONS: Ramipril 25mg. atorvastatin 10mg, metoprolol 25mg, metformin 500mg, aspirin 81mg, one a day mens multivitamin, lantus 10 units in the morning (alternates 10-12 units based on blood glucose levels)      MEDICATIONS  (STANDING):  dextrose 5%. 1000 milliLiter(s) (50 mL/Hr) IV Continuous <Continuous>  dextrose 50% Injectable 12.5 Gram(s) IV Push once  dextrose 50% Injectable 25 Gram(s) IV Push once  dextrose 50% Injectable 25 Gram(s) IV Push once  famotidine    Tablet 20 milliGRAM(s) Oral once  heparin  Injectable 5000 Unit(s) SubCutaneous every 8 hours  insulin glargine Injectable (LANTUS) 10 Unit(s) SubCutaneous at bedtime  insulin lispro (HumaLOG) corrective regimen sliding scale   SubCutaneous every 2 hours  lactated ringers. 1000 milliLiter(s) (90 mL/Hr) IV Continuous <Continuous>  nicotine - 21 mG/24Hr(s) Patch 1 patch Transdermal daily    MEDICATIONS  (PRN):  dextrose 40% Gel 15 Gram(s) Oral once PRN Blood Glucose LESS THAN 70 milliGRAM(s)/deciliter  docusate sodium 100 milliGRAM(s) Oral three times a day PRN Constipation  glucagon  Injectable 1 milliGRAM(s) IntraMuscular once PRN Glucose LESS THAN 70 milligrams/deciliter  ondansetron Injectable 4 milliGRAM(s) IV Push every 6 hours PRN Nausea and/or Vomiting  senna 2 Tablet(s) Oral at bedtime PRN Constipation      ALLERGIES: Denies          VITAL SIGNS:  Vital Signs Last 24 Hrs  T(C): 36.3 (17 Mar 2019 17:07), Max: 37.2 (17 Mar 2019 05:09)  T(F): 97.4 (17 Mar 2019 17:07), Max: 99 (17 Mar 2019 05:09)  HR: 88 (17 Mar 2019 17:07) (88 - 98)  BP: 121/74 (17 Mar 2019 17:07) (112/66 - 128/74)  BP(mean): --  RR: 17 (17 Mar 2019 17:07) (17 - 18)  SpO2: 99% (17 Mar 2019 17:07) (99% - 100%)    03-16-19 @ 07:01  -  03-17-19 @ 07:00  --------------------------------------------------------  IN:    IV PiggyBack: 50 mL    lactated ringers.: 2070 mL    lactated ringers.: 120 mL    Oral Fluid: 360 mL  Total IN: 2600 mL    OUT:    Voided: 750 mL  Total OUT: 750 mL    Total NET: 1850 mL      03-17-19 @ 07:01  -  03-17-19 @ 21:18  --------------------------------------------------------  IN:    lactated ringers.: 1080 mL    Oral Fluid: 1300 mL  Total IN: 2380 mL    OUT:  Total OUT: 0 mL    Total NET: 2380 mL          PHYSICAL EXAM:  Constitutional: WDWN sitting in chair in NAD  Head: NC/AT  Eyes: PERRL, EOMI, anicteric sclera  ENT: no nasal discharge; uvula midline, no oropharyngeal erythema or exudates; MMM  Neck: supple; no JVD or thyromegaly  Respiratory: CTA B/L; no W/R/R, no retractions  Cardiac: +S1/S2; RRR; no M/R/G; PMI non-displaced  Gastrointestinal: abdomen soft, NT/ND; no rebound or guarding; +BSx4  Genitourinary: normal external genitalia  Back: spine midline, no bony tenderness or step-offs; no CVAT B/L  Extremities: WWP, no clubbing or cyanosis; no peripheral edema  Musculoskeletal: NROM x4; no joint swelling, tenderness or erythema  Vascular: 2+ radial, femoral, DP/PT pulses B/L  Dermatologic: skin warm, dry and intact; no rashes, wounds, or scars  Lymphatic: no submandibular or cervical LAD  Neurologic: AAOx3; CNII-XII grossly intact; no focal deficits  Psychiatric: affect and characteristics of appearance, verbalizations, behaviors are appropriate    LABS:                        8.5    5.23  )-----------( 217      ( 17 Mar 2019 07:48 )             25.8     03-17    130<L>  |  100  |  13  ----------------------------<  92  4.0   |  23  |  0.58    Ca    7.8<L>      17 Mar 2019 07:48  Phos  2.9     03-17  Mg     1.4     03-17    TPro  4.7<L>  /  Alb  2.5<L>  /  TBili  0.3  /  DBili  x   /  AST  107<H>  /  ALT  52<H>  /  AlkPhos  351<H>  03-17            CAPILLARY BLOOD GLUCOSE      POCT Blood Glucose.: 213 mg/dL (17 Mar 2019 17:04)  POCT Blood Glucose.: 336 mg/dL (17 Mar 2019 12:23)  POCT Blood Glucose.: 103 mg/dL (17 Mar 2019 08:03)  POCT Blood Glucose.: 70 mg/dL (17 Mar 2019 06:46)  POCT Blood Glucose.: 67 mg/dL (17 Mar 2019 06:19)  POCT Blood Glucose.: 208 mg/dL (17 Mar 2019 02:01)  POCT Blood Glucose.: 397 mg/dL (17 Mar 2019 00:36)  POCT Blood Glucose.: 444 mg/dL (16 Mar 2019 21:30)  POCT Blood Glucose.: 535 mg/dL (16 Mar 2019 21:19)          RADIOLOGY & ADDITIONAL TESTS: Reviewed. INTERNAL MEDICINE SERVICE INITIAL CONSULT NOTE    HPI:  73 year old male PMH of CAD with stent (over 10 years ago, per patient stent is no longer patent and had procedures to try to open it up but no is undergoing medical management), HTN, DM, GERD, emphysema, L hernia surgery, lap tila on 2/26 for symptomatic gallstone, who presented with abdominal pain, mainly epigastric, RUQ and RLQ without radiation.  He also states that the pain was worse with standing (resolved with sitting or laying down) and described as dull, 10/10 with 4 episodes of NBNB vomiting and more frequent bowel movements but not diarrhea.  He has been able to tolerate food.  He states that he called Dr. Cantor's office to try to see him and the quickest way to do so so he presented to the ED.  Patient fodn to have fluid collection in RUQ, going for MRCP 3/18.  Additionally, the patient states that he has had 2 weeks of sore throat with difficulty swallowing and bilateral fungus on both of his feet.      REVIEW OF SYSTEMS:   Dull epigastric and right sided abdominal pain, dysphagia, bilateral foot fungus    PAST MEDICAL HISTORY: CAD with stent (over 10 years ago, per patient stent is no longer patent and had procedures to try to open it up but no is undergoing medical management), HTN, DM, GERD, emphysema    PAST SURGICAL HISTORY:  L hernia surgery, lap tila on 2/26 for symptomatic gallstone    FAMILY HISTORY: Colon CA in mother    SOCIAL HISTORY:  Tobacco use: 1PPD smoker for 45 years  EtOH use: 2 12oz beers every other day  Illicit drug use: Denies    MEDICATIONS: Ramipril 25mg. atorvastatin 10mg, metoprolol 25mg, metformin 500mg, aspirin 81mg, one a day mens multivitamin, lantus 10 units in the morning (alternates 10-12 units based on blood glucose levels)      MEDICATIONS  (STANDING):  dextrose 5%. 1000 milliLiter(s) (50 mL/Hr) IV Continuous <Continuous>  dextrose 50% Injectable 12.5 Gram(s) IV Push once  dextrose 50% Injectable 25 Gram(s) IV Push once  dextrose 50% Injectable 25 Gram(s) IV Push once  famotidine    Tablet 20 milliGRAM(s) Oral once  heparin  Injectable 5000 Unit(s) SubCutaneous every 8 hours  insulin glargine Injectable (LANTUS) 10 Unit(s) SubCutaneous at bedtime  insulin lispro (HumaLOG) corrective regimen sliding scale   SubCutaneous every 2 hours  lactated ringers. 1000 milliLiter(s) (90 mL/Hr) IV Continuous <Continuous>  nicotine - 21 mG/24Hr(s) Patch 1 patch Transdermal daily    MEDICATIONS  (PRN):  dextrose 40% Gel 15 Gram(s) Oral once PRN Blood Glucose LESS THAN 70 milliGRAM(s)/deciliter  docusate sodium 100 milliGRAM(s) Oral three times a day PRN Constipation  glucagon  Injectable 1 milliGRAM(s) IntraMuscular once PRN Glucose LESS THAN 70 milligrams/deciliter  ondansetron Injectable 4 milliGRAM(s) IV Push every 6 hours PRN Nausea and/or Vomiting  senna 2 Tablet(s) Oral at bedtime PRN Constipation      ALLERGIES: Denies          VITAL SIGNS:  Vital Signs Last 24 Hrs  T(C): 36.3 (17 Mar 2019 17:07), Max: 37.2 (17 Mar 2019 05:09)  T(F): 97.4 (17 Mar 2019 17:07), Max: 99 (17 Mar 2019 05:09)  HR: 88 (17 Mar 2019 17:07) (88 - 98)  BP: 121/74 (17 Mar 2019 17:07) (112/66 - 128/74)  BP(mean): --  RR: 17 (17 Mar 2019 17:07) (17 - 18)  SpO2: 99% (17 Mar 2019 17:07) (99% - 100%)    03-16-19 @ 07:01  -  03-17-19 @ 07:00  --------------------------------------------------------  IN:    IV PiggyBack: 50 mL    lactated ringers.: 2070 mL    lactated ringers.: 120 mL    Oral Fluid: 360 mL  Total IN: 2600 mL    OUT:    Voided: 750 mL  Total OUT: 750 mL    Total NET: 1850 mL      03-17-19 @ 07:01  -  03-17-19 @ 21:18  --------------------------------------------------------  IN:    lactated ringers.: 1080 mL    Oral Fluid: 1300 mL  Total IN: 2380 mL    OUT:  Total OUT: 0 mL    Total NET: 2380 mL          PHYSICAL EXAM:  Constitutional: WDWN sitting in chair in NAD  Head: NC/AT  Eyes: PERRL, EOMI, anicteric sclera  ENT: no nasal discharge; uvula midline, no oropharyngeal erythema or exudates; MMM  Neck: supple; no JVD or thyromegaly  Respiratory: CTA B/L; no W/R/R, no retractions  Cardiac: +S1/S2; RRR; no M/R/G; PMI non-displaced  Gastrointestinal: abdomen soft, non distended, tender to palpation of epigastric region and RUQ/RLQ, ecchymoses throughout abdomen   Extremities: WWP, no clubbing or cyanosis; no peripheral edema, fungal infection of bilateral feet  Musculoskeletal: NROM x4; no joint swelling, tenderness or erythema  Vascular: 2+ radial, femoral, DP/PT pulses B/L  Dermatologic: skin warm, dry and intact; no rashes, wounds, or scars  Neurologic: AAOx3; CNII-XII grossly intact; no focal deficits      LABS:                        8.5    5.23  )-----------( 217      ( 17 Mar 2019 07:48 )             25.8     03-17    130<L>  |  100  |  13  ----------------------------<  92  4.0   |  23  |  0.58    Ca    7.8<L>      17 Mar 2019 07:48  Phos  2.9     03-17  Mg     1.4     03-17    TPro  4.7<L>  /  Alb  2.5<L>  /  TBili  0.3  /  DBili  x   /  AST  107<H>  /  ALT  52<H>  /  AlkPhos  351<H>  03-17            CAPILLARY BLOOD GLUCOSE      POCT Blood Glucose.: 213 mg/dL (17 Mar 2019 17:04)  POCT Blood Glucose.: 336 mg/dL (17 Mar 2019 12:23)  POCT Blood Glucose.: 103 mg/dL (17 Mar 2019 08:03)  POCT Blood Glucose.: 70 mg/dL (17 Mar 2019 06:46)  POCT Blood Glucose.: 67 mg/dL (17 Mar 2019 06:19)  POCT Blood Glucose.: 208 mg/dL (17 Mar 2019 02:01)  POCT Blood Glucose.: 397 mg/dL (17 Mar 2019 00:36)  POCT Blood Glucose.: 444 mg/dL (16 Mar 2019 21:30)  POCT Blood Glucose.: 535 mg/dL (16 Mar 2019 21:19) INTERNAL MEDICINE SERVICE INITIAL CONSULT NOTE    HPI:  73 year old male PMH of CAD with stent (over 10 years ago, per patient stent is no longer patent and had procedures to try to open it up but now is undergoing medical management), HTN, DM, GERD, emphysema, L hernia surgery, lap tila on 2/26 for symptomatic gallstone, who presented with abdominal pain, mainly epigastric, RUQ and RLQ without radiation.  He also states that the pain was worse with standing (resolved with sitting or laying down) and described as dull, 10/10 with 4 episodes of NBNB vomiting and more frequent bowel movements but not diarrhea.  He has been able to tolerate food.  He states that he called Dr. Cantor's office to try to see him and the quickest way to do so so he presented to the ED.  Patient fodn to have fluid collection in RUQ, going for MRCP 3/18.  Additionally, the patient states that he has had 2 weeks of sore throat with difficulty swallowing and bilateral fungus on both of his feet.      REVIEW OF SYSTEMS:   Dull epigastric and right sided abdominal pain, dysphagia, bilateral foot fungus    PAST MEDICAL HISTORY: CAD with stent (over 10 years ago, per patient stent is no longer patent and had procedures to try to open it up but no is undergoing medical management), HTN, DM, GERD, emphysema    PAST SURGICAL HISTORY:  L hernia surgery, lap tila on 2/26 for symptomatic gallstone    FAMILY HISTORY: Colon CA in mother    SOCIAL HISTORY:  Tobacco use: 1PPD smoker for 45 years  EtOH use: 2 12oz beers every other day  Illicit drug use: Denies    MEDICATIONS: Ramipril 25mg. atorvastatin 10mg, metoprolol 25mg, metformin 500mg, aspirin 81mg, one a day mens multivitamin, lantus 10 units in the morning (alternates 10-12 units based on blood glucose levels)      MEDICATIONS  (STANDING):  dextrose 5%. 1000 milliLiter(s) (50 mL/Hr) IV Continuous <Continuous>  dextrose 50% Injectable 12.5 Gram(s) IV Push once  dextrose 50% Injectable 25 Gram(s) IV Push once  dextrose 50% Injectable 25 Gram(s) IV Push once  famotidine    Tablet 20 milliGRAM(s) Oral once  heparin  Injectable 5000 Unit(s) SubCutaneous every 8 hours  insulin glargine Injectable (LANTUS) 10 Unit(s) SubCutaneous at bedtime  insulin lispro (HumaLOG) corrective regimen sliding scale   SubCutaneous every 2 hours  lactated ringers. 1000 milliLiter(s) (90 mL/Hr) IV Continuous <Continuous>  nicotine - 21 mG/24Hr(s) Patch 1 patch Transdermal daily    MEDICATIONS  (PRN):  dextrose 40% Gel 15 Gram(s) Oral once PRN Blood Glucose LESS THAN 70 milliGRAM(s)/deciliter  docusate sodium 100 milliGRAM(s) Oral three times a day PRN Constipation  glucagon  Injectable 1 milliGRAM(s) IntraMuscular once PRN Glucose LESS THAN 70 milligrams/deciliter  ondansetron Injectable 4 milliGRAM(s) IV Push every 6 hours PRN Nausea and/or Vomiting  senna 2 Tablet(s) Oral at bedtime PRN Constipation      ALLERGIES: Denies          VITAL SIGNS:  Vital Signs Last 24 Hrs  T(C): 36.3 (17 Mar 2019 17:07), Max: 37.2 (17 Mar 2019 05:09)  T(F): 97.4 (17 Mar 2019 17:07), Max: 99 (17 Mar 2019 05:09)  HR: 88 (17 Mar 2019 17:07) (88 - 98)  BP: 121/74 (17 Mar 2019 17:07) (112/66 - 128/74)  BP(mean): --  RR: 17 (17 Mar 2019 17:07) (17 - 18)  SpO2: 99% (17 Mar 2019 17:07) (99% - 100%)    03-16-19 @ 07:01  -  03-17-19 @ 07:00  --------------------------------------------------------  IN:    IV PiggyBack: 50 mL    lactated ringers.: 2070 mL    lactated ringers.: 120 mL    Oral Fluid: 360 mL  Total IN: 2600 mL    OUT:    Voided: 750 mL  Total OUT: 750 mL    Total NET: 1850 mL      03-17-19 @ 07:01  -  03-17-19 @ 21:18  --------------------------------------------------------  IN:    lactated ringers.: 1080 mL    Oral Fluid: 1300 mL  Total IN: 2380 mL    OUT:  Total OUT: 0 mL    Total NET: 2380 mL          PHYSICAL EXAM:  Constitutional: WDWN sitting in chair in NAD  Head: NC/AT  Eyes: PERRL, EOMI, anicteric sclera  ENT: no nasal discharge; uvula midline, no oropharyngeal erythema or exudates; MMM  Neck: supple; no JVD or thyromegaly  Respiratory: CTA B/L; no W/R/R, no retractions  Cardiac: +S1/S2; RRR; no M/R/G; PMI non-displaced  Gastrointestinal: abdomen soft, non distended, tender to palpation of epigastric region and RUQ/RLQ, ecchymoses throughout abdomen   Extremities: WWP, no clubbing or cyanosis; no peripheral edema, fungal infection of bilateral feet  Musculoskeletal: NROM x4; no joint swelling, tenderness or erythema  Vascular: 2+ radial, femoral, DP/PT pulses B/L  Dermatologic: skin warm, dry and intact; no rashes, wounds, or scars  Neurologic: AAOx3; CNII-XII grossly intact; no focal deficits      LABS:                        8.5    5.23  )-----------( 217      ( 17 Mar 2019 07:48 )             25.8     03-17    130<L>  |  100  |  13  ----------------------------<  92  4.0   |  23  |  0.58    Ca    7.8<L>      17 Mar 2019 07:48  Phos  2.9     03-17  Mg     1.4     03-17    TPro  4.7<L>  /  Alb  2.5<L>  /  TBili  0.3  /  DBili  x   /  AST  107<H>  /  ALT  52<H>  /  AlkPhos  351<H>  03-17            CAPILLARY BLOOD GLUCOSE      POCT Blood Glucose.: 213 mg/dL (17 Mar 2019 17:04)  POCT Blood Glucose.: 336 mg/dL (17 Mar 2019 12:23)  POCT Blood Glucose.: 103 mg/dL (17 Mar 2019 08:03)  POCT Blood Glucose.: 70 mg/dL (17 Mar 2019 06:46)  POCT Blood Glucose.: 67 mg/dL (17 Mar 2019 06:19)  POCT Blood Glucose.: 208 mg/dL (17 Mar 2019 02:01)  POCT Blood Glucose.: 397 mg/dL (17 Mar 2019 00:36)  POCT Blood Glucose.: 444 mg/dL (16 Mar 2019 21:30)  POCT Blood Glucose.: 535 mg/dL (16 Mar 2019 21:19) INTERNAL MEDICINE SERVICE INITIAL CONSULT NOTE    HPI:  73 year old male PMH of CAD with stent (over 10 years ago, per patient stent is no longer patent and had procedures to try to open it up but now is undergoing medical management), HTN, DM, GERD, emphysema, L hernia surgery, lap tila on 2/26 for symptomatic gallstone, who presented with abdominal pain, mainly epigastric, RUQ and RLQ without radiation.  He also states that the pain was worse with standing (resolved with sitting or laying down) and described as dull, 10/10 with 4 episodes of NBNB vomiting and more frequent bowel movements but not diarrhea.  He has been able to tolerate food.  He states that he called Dr. Cantor's office to try to see him and the quickest way to do so would be through the ED so he presented to the ED.  Patient found to have fluid collection in RUQ, going for MRCP 3/18.  Additionally, the patient states that he has had 2 weeks of sore throat with difficulty swallowing and bilateral fungus on both of his feet.      REVIEW OF SYSTEMS:   Dull epigastric and right sided abdominal pain, dysphagia, bilateral foot fungus    PAST MEDICAL HISTORY: CAD with stent (over 10 years ago, per patient stent is no longer patent and had procedures to try to open it up but no is undergoing medical management), HTN, DM, GERD, emphysema    PAST SURGICAL HISTORY:  L hernia surgery, lap tila on 2/26 for symptomatic gallstone    FAMILY HISTORY: Colon CA in mother    SOCIAL HISTORY:  Tobacco use: 1PPD smoker for 45 years  EtOH use: 2 12oz beers every other day  Illicit drug use: Denies    MEDICATIONS: Ramipril 25mg. atorvastatin 10mg, metoprolol 25mg, metformin 500mg, aspirin 81mg, one a day mens multivitamin, lantus 10 units in the morning (alternates 10-12 units based on blood glucose levels)      MEDICATIONS  (STANDING):  dextrose 5%. 1000 milliLiter(s) (50 mL/Hr) IV Continuous <Continuous>  dextrose 50% Injectable 12.5 Gram(s) IV Push once  dextrose 50% Injectable 25 Gram(s) IV Push once  dextrose 50% Injectable 25 Gram(s) IV Push once  famotidine    Tablet 20 milliGRAM(s) Oral once  heparin  Injectable 5000 Unit(s) SubCutaneous every 8 hours  insulin glargine Injectable (LANTUS) 10 Unit(s) SubCutaneous at bedtime  insulin lispro (HumaLOG) corrective regimen sliding scale   SubCutaneous every 2 hours  lactated ringers. 1000 milliLiter(s) (90 mL/Hr) IV Continuous <Continuous>  nicotine - 21 mG/24Hr(s) Patch 1 patch Transdermal daily    MEDICATIONS  (PRN):  dextrose 40% Gel 15 Gram(s) Oral once PRN Blood Glucose LESS THAN 70 milliGRAM(s)/deciliter  docusate sodium 100 milliGRAM(s) Oral three times a day PRN Constipation  glucagon  Injectable 1 milliGRAM(s) IntraMuscular once PRN Glucose LESS THAN 70 milligrams/deciliter  ondansetron Injectable 4 milliGRAM(s) IV Push every 6 hours PRN Nausea and/or Vomiting  senna 2 Tablet(s) Oral at bedtime PRN Constipation      ALLERGIES: Denies          VITAL SIGNS:  Vital Signs Last 24 Hrs  T(C): 36.3 (17 Mar 2019 17:07), Max: 37.2 (17 Mar 2019 05:09)  T(F): 97.4 (17 Mar 2019 17:07), Max: 99 (17 Mar 2019 05:09)  HR: 88 (17 Mar 2019 17:07) (88 - 98)  BP: 121/74 (17 Mar 2019 17:07) (112/66 - 128/74)  BP(mean): --  RR: 17 (17 Mar 2019 17:07) (17 - 18)  SpO2: 99% (17 Mar 2019 17:07) (99% - 100%)    03-16-19 @ 07:01  -  03-17-19 @ 07:00  --------------------------------------------------------  IN:    IV PiggyBack: 50 mL    lactated ringers.: 2070 mL    lactated ringers.: 120 mL    Oral Fluid: 360 mL  Total IN: 2600 mL    OUT:    Voided: 750 mL  Total OUT: 750 mL    Total NET: 1850 mL      03-17-19 @ 07:01  -  03-17-19 @ 21:18  --------------------------------------------------------  IN:    lactated ringers.: 1080 mL    Oral Fluid: 1300 mL  Total IN: 2380 mL    OUT:  Total OUT: 0 mL    Total NET: 2380 mL          PHYSICAL EXAM:  Constitutional: WDWN sitting in chair in NAD  Head: NC/AT  Eyes: PERRL, EOMI, anicteric sclera  ENT: no nasal discharge; uvula midline, no oropharyngeal erythema or exudates; MMM  Neck: supple; no JVD or thyromegaly  Respiratory: CTA B/L; no W/R/R, no retractions  Cardiac: +S1/S2; RRR; no M/R/G; PMI non-displaced  Gastrointestinal: abdomen soft, non distended, tender to palpation of epigastric region and RUQ/RLQ, ecchymoses throughout abdomen   Extremities: WWP, no clubbing or cyanosis; no peripheral edema, fungal infection of bilateral feet  Musculoskeletal: NROM x4; no joint swelling, tenderness or erythema  Vascular: 2+ radial, femoral, DP/PT pulses B/L  Dermatologic: skin warm, dry and intact; no rashes, wounds, or scars  Neurologic: AAOx3; CNII-XII grossly intact; no focal deficits      LABS:                        8.5    5.23  )-----------( 217      ( 17 Mar 2019 07:48 )             25.8     03-17    130<L>  |  100  |  13  ----------------------------<  92  4.0   |  23  |  0.58    Ca    7.8<L>      17 Mar 2019 07:48  Phos  2.9     03-17  Mg     1.4     03-17    TPro  4.7<L>  /  Alb  2.5<L>  /  TBili  0.3  /  DBili  x   /  AST  107<H>  /  ALT  52<H>  /  AlkPhos  351<H>  03-17            CAPILLARY BLOOD GLUCOSE      POCT Blood Glucose.: 213 mg/dL (17 Mar 2019 17:04)  POCT Blood Glucose.: 336 mg/dL (17 Mar 2019 12:23)  POCT Blood Glucose.: 103 mg/dL (17 Mar 2019 08:03)  POCT Blood Glucose.: 70 mg/dL (17 Mar 2019 06:46)  POCT Blood Glucose.: 67 mg/dL (17 Mar 2019 06:19)  POCT Blood Glucose.: 208 mg/dL (17 Mar 2019 02:01)  POCT Blood Glucose.: 397 mg/dL (17 Mar 2019 00:36)  POCT Blood Glucose.: 444 mg/dL (16 Mar 2019 21:30)  POCT Blood Glucose.: 535 mg/dL (16 Mar 2019 21:19) INTERNAL MEDICINE SERVICE INITIAL CONSULT NOTE    HPI:  73 year old male PMH of CAD with stent (over 10 years ago, per patient stent is no longer patent and had procedures to try to open it up but now is undergoing medical management), HTN, DM, GERD, emphysema, L hernia surgery, lap tila on 2/26 for symptomatic gallstone, who presented with abdominal pain, mainly epigastric, RUQ and RLQ without radiation.  He also states that the pain was worse with standing (resolved with sitting or laying down) and described as dull, 10/10 with 4 episodes of NBNB vomiting and more frequent bowel movements but not diarrhea.  He has been able to tolerate food.  He states that he called Dr. Cantor's office to try to see him and the quickest way to do so would be through the ED so he presented to the ED.  Patient found to have fluid collection in RUQ, going for MRCP 3/18.  Additionally, the patient states that he has had 2 weeks of sore throat with difficulty swallowing and bilateral fungus on both of his feet.      REVIEW OF SYSTEMS:   Dull epigastric and right sided abdominal pain, dysphagia, bilateral foot fungus. 12 pt ROS is otherwise negative    PAST MEDICAL HISTORY: CAD with stent (over 10 years ago, per patient stent is no longer patent and had procedures to try to open it up but no is undergoing medical management), HTN, DM, GERD, emphysema    PAST SURGICAL HISTORY:  L hernia surgery, lap tila on 2/26 for symptomatic gallstone    FAMILY HISTORY: Colon CA in mother    SOCIAL HISTORY:  Tobacco use: 1PPD smoker for 45 years  EtOH use: 2 12oz beers every other day  Illicit drug use: Denies    MEDICATIONS: Ramipril 25mg. atorvastatin 10mg, metoprolol 25mg, metformin 500mg, aspirin 81mg, one a day mens multivitamin, lantus 10 units in the morning (alternates 10-12 units based on blood glucose levels)      MEDICATIONS  (STANDING):  dextrose 5%. 1000 milliLiter(s) (50 mL/Hr) IV Continuous <Continuous>  dextrose 50% Injectable 12.5 Gram(s) IV Push once  dextrose 50% Injectable 25 Gram(s) IV Push once  dextrose 50% Injectable 25 Gram(s) IV Push once  famotidine    Tablet 20 milliGRAM(s) Oral once  heparin  Injectable 5000 Unit(s) SubCutaneous every 8 hours  insulin glargine Injectable (LANTUS) 10 Unit(s) SubCutaneous at bedtime  insulin lispro (HumaLOG) corrective regimen sliding scale   SubCutaneous every 2 hours  lactated ringers. 1000 milliLiter(s) (90 mL/Hr) IV Continuous <Continuous>  nicotine - 21 mG/24Hr(s) Patch 1 patch Transdermal daily    MEDICATIONS  (PRN):  dextrose 40% Gel 15 Gram(s) Oral once PRN Blood Glucose LESS THAN 70 milliGRAM(s)/deciliter  docusate sodium 100 milliGRAM(s) Oral three times a day PRN Constipation  glucagon  Injectable 1 milliGRAM(s) IntraMuscular once PRN Glucose LESS THAN 70 milligrams/deciliter  ondansetron Injectable 4 milliGRAM(s) IV Push every 6 hours PRN Nausea and/or Vomiting  senna 2 Tablet(s) Oral at bedtime PRN Constipation      ALLERGIES: Denies          VITAL SIGNS:  Vital Signs Last 24 Hrs  T(C): 36.3 (17 Mar 2019 17:07), Max: 37.2 (17 Mar 2019 05:09)  T(F): 97.4 (17 Mar 2019 17:07), Max: 99 (17 Mar 2019 05:09)  HR: 88 (17 Mar 2019 17:07) (88 - 98)  BP: 121/74 (17 Mar 2019 17:07) (112/66 - 128/74)  BP(mean): --  RR: 17 (17 Mar 2019 17:07) (17 - 18)  SpO2: 99% (17 Mar 2019 17:07) (99% - 100%)    03-16-19 @ 07:01  -  03-17-19 @ 07:00  --------------------------------------------------------  IN:    IV PiggyBack: 50 mL    lactated ringers.: 2070 mL    lactated ringers.: 120 mL    Oral Fluid: 360 mL  Total IN: 2600 mL    OUT:    Voided: 750 mL  Total OUT: 750 mL    Total NET: 1850 mL      03-17-19 @ 07:01  -  03-17-19 @ 21:18  --------------------------------------------------------  IN:    lactated ringers.: 1080 mL    Oral Fluid: 1300 mL  Total IN: 2380 mL    OUT:  Total OUT: 0 mL    Total NET: 2380 mL          PHYSICAL EXAM:  Constitutional: WDWN sitting in chair in NAD  Head: NC/AT  Eyes: PERRL, EOMI, anicteric sclera  ENT: no nasal discharge; uvula midline, no oropharyngeal erythema or exudates; MMM  Neck: supple; no JVD or thyromegaly  Respiratory: CTA B/L; no W/R/R, no retractions  Cardiac: +S1/S2; RRR; no M/R/G; PMI non-displaced  Gastrointestinal: abdomen soft, non distended, tender to palpation of epigastric region and RUQ/RLQ, ecchymoses throughout abdomen   Extremities: WWP, no clubbing or cyanosis; no peripheral edema, fungal infection of bilateral feet  Musculoskeletal: NROM x4; no joint swelling, tenderness or erythema  Vascular: 2+ radial, femoral, DP/PT pulses B/L  Dermatologic: skin warm, dry and intact; no rashes, wounds, or scars  Neurologic: AAOx3; CNII-XII grossly intact; no focal deficits      LABS:                        8.5    5.23  )-----------( 217      ( 17 Mar 2019 07:48 )             25.8     03-17    130<L>  |  100  |  13  ----------------------------<  92  4.0   |  23  |  0.58    Ca    7.8<L>      17 Mar 2019 07:48  Phos  2.9     03-17  Mg     1.4     03-17    TPro  4.7<L>  /  Alb  2.5<L>  /  TBili  0.3  /  DBili  x   /  AST  107<H>  /  ALT  52<H>  /  AlkPhos  351<H>  03-17            CAPILLARY BLOOD GLUCOSE      POCT Blood Glucose.: 213 mg/dL (17 Mar 2019 17:04)  POCT Blood Glucose.: 336 mg/dL (17 Mar 2019 12:23)  POCT Blood Glucose.: 103 mg/dL (17 Mar 2019 08:03)  POCT Blood Glucose.: 70 mg/dL (17 Mar 2019 06:46)  POCT Blood Glucose.: 67 mg/dL (17 Mar 2019 06:19)  POCT Blood Glucose.: 208 mg/dL (17 Mar 2019 02:01)  POCT Blood Glucose.: 397 mg/dL (17 Mar 2019 00:36)  POCT Blood Glucose.: 444 mg/dL (16 Mar 2019 21:30)  POCT Blood Glucose.: 535 mg/dL (16 Mar 2019 21:19)

## 2019-03-17 NOTE — CONSULT NOTE ADULT - ASSESSMENT
73 Year old, M, PMH of CAD S/p stent, HTN, DM, S/p Lap tila on 2/26 for symptomatic gall stone, present with abdominal pain    RUQ pain:  -no fever, vss, wbc normal  -bili normal, > 3 51, mild ast elevation  -US abdomen: normal CBD at 7mm, mild IHB dilatation  -CT abdomen, no collection in GB fossa, 2.4x 2.5 collection in the anterior abdominal wall  -MRCP pending, will follow 73 Year old, M, PMH of CAD S/p stent, HTN, DM, S/p Lap tila on 2/26 for symptomatic gall stone, present with abdominal pain    RUQ pain:  -no fever, vss, wbc normal  -bili normal, > 3 51, mild ast elevation  -US abdomen: normal CBD at 7mm, mild IHB dilatation  -CT abdomen, no collection in GB fossa, 2.4x 2.5 collection in the anterior abdominal wall  -MRCP pending, will follow  -recommend IR guided aspiration of the abdominal collection tomorrow am  -keep npo after midnight for possible ERCP for elevated ALP pending scheduling   -trend LFT, check PT/INR    discussed with Dr Sheppard

## 2019-03-17 NOTE — CONSULT NOTE ADULT - NSICDXPROBLEM_GEN_ALL_CORE_FT
PROBLEM DIAGNOSES  Problem: Diabetes mellitus  Recommendation: -Patient with history of diabetes mellitus, takes lantus 10-12 units at home in the morning based on blood glucose levels.  Patient with blood glucose levels between .  Would recommend continue lantus 10 units at bedtime, sliding scale, consistent carbohydrate diet and endocrine consultation in the morning    Problem: Dysphagia  Recommendation: -Patient with sore throat and difficulty swallowing for 2 weeks.  Does endorse 45 pack year smoking history.  Unable to visualize entire throat, would recommend ENT evaluation.    Problem: Nail fungal infection  Recommendation: -Patient with fungal nail infection of bilateral lower extremities.  Would recommend topical clotrimazole and podiatry consultation. PROBLEM DIAGNOSES  Problem: Diabetes mellitus  Recommendation: -Patient with history of diabetes mellitus, takes lantus 10-12 units at home in the morning based on blood glucose levels.  Patient with blood glucose levels between .  Would recommend continue lantus 10 units at bedtime, sliding scale, consistent carbohydrate diet and endocrine consultation in the morning.  If patient to be NPO for further studies or procedures, would cut lantus in half.    Problem: Dysphagia  Recommendation: -Patient with sore throat and difficulty swallowing for 2 weeks.  Does endorse 45 pack year smoking history.  Unable to visualize entire throat, would recommend ENT evaluation.    Problem: Nail fungal infection  Recommendation: -Patient with fungal nail infection of bilateral lower extremities.  Would recommend podiatry consultation.    Problem: Hyponatremia  Recommendation: -Patient found to have Na of 130, would obtain serum osm, urine osm and urine Na in order to determine underlying cause.  Can be found in postoperative patients.    Problem: Hypomagnesemia  Recommendation: -Patient found to have Mg of 1.4, replete PRN    Problem: Anemia  Recommendation: -Patient found to have macrocytic anemia on this admission with hemoglobin of 8.5 and MCV of 104.5.  No signs/symptoms of active bleeding, patient hemodynamically stable.    -Maintain active type and screen  -Obtain B12 and folate level

## 2019-03-18 ENCOUNTER — TRANSCRIPTION ENCOUNTER (OUTPATIENT)
Age: 74
End: 2019-03-18

## 2019-03-18 VITALS
RESPIRATION RATE: 17 BRPM | OXYGEN SATURATION: 100 % | TEMPERATURE: 98 F | DIASTOLIC BLOOD PRESSURE: 81 MMHG | SYSTOLIC BLOOD PRESSURE: 140 MMHG | HEART RATE: 91 BPM

## 2019-03-18 DIAGNOSIS — E83.42 HYPOMAGNESEMIA: ICD-10-CM

## 2019-03-18 DIAGNOSIS — D64.9 ANEMIA, UNSPECIFIED: ICD-10-CM

## 2019-03-18 DIAGNOSIS — E87.1 HYPO-OSMOLALITY AND HYPONATREMIA: ICD-10-CM

## 2019-03-18 LAB
ALBUMIN SERPL ELPH-MCNC: 2.6 G/DL — LOW (ref 3.3–5)
ALP SERPL-CCNC: 296 U/L — HIGH (ref 40–120)
ALT FLD-CCNC: 44 U/L — SIGNIFICANT CHANGE UP (ref 10–45)
ANION GAP SERPL CALC-SCNC: 5 MMOL/L — SIGNIFICANT CHANGE UP (ref 5–17)
APTT BLD: 66.2 SEC — HIGH (ref 27.5–36.3)
AST SERPL-CCNC: 59 U/L — HIGH (ref 10–40)
BILIRUB SERPL-MCNC: 0.3 MG/DL — SIGNIFICANT CHANGE UP (ref 0.2–1.2)
BUN SERPL-MCNC: 16 MG/DL — SIGNIFICANT CHANGE UP (ref 7–23)
CALCIUM SERPL-MCNC: 7.7 MG/DL — LOW (ref 8.4–10.5)
CHLORIDE SERPL-SCNC: 106 MMOL/L — SIGNIFICANT CHANGE UP (ref 96–108)
CO2 SERPL-SCNC: 21 MMOL/L — LOW (ref 22–31)
CREAT SERPL-MCNC: 0.61 MG/DL — SIGNIFICANT CHANGE UP (ref 0.5–1.3)
GLUCOSE BLDC GLUCOMTR-MCNC: 125 MG/DL — HIGH (ref 70–99)
GLUCOSE BLDC GLUCOMTR-MCNC: 135 MG/DL — HIGH (ref 70–99)
GLUCOSE BLDC GLUCOMTR-MCNC: 358 MG/DL — HIGH (ref 70–99)
GLUCOSE SERPL-MCNC: 129 MG/DL — HIGH (ref 70–99)
HBA1C BLD-MCNC: 7.9 % — HIGH (ref 4–5.6)
HCT VFR BLD CALC: 25.5 % — LOW (ref 39–50)
HGB BLD-MCNC: 8.2 G/DL — LOW (ref 13–17)
INR BLD: 1.06 — SIGNIFICANT CHANGE UP (ref 0.88–1.16)
MAGNESIUM SERPL-MCNC: 1.7 MG/DL — SIGNIFICANT CHANGE UP (ref 1.6–2.6)
MCHC RBC-ENTMCNC: 32.2 GM/DL — SIGNIFICANT CHANGE UP (ref 32–36)
MCHC RBC-ENTMCNC: 34.2 PG — HIGH (ref 27–34)
MCV RBC AUTO: 106.3 FL — HIGH (ref 80–100)
NRBC # BLD: 0 /100 WBCS — SIGNIFICANT CHANGE UP (ref 0–0)
PHOSPHATE SERPL-MCNC: 2.6 MG/DL — SIGNIFICANT CHANGE UP (ref 2.5–4.5)
PLATELET # BLD AUTO: 193 K/UL — SIGNIFICANT CHANGE UP (ref 150–400)
POTASSIUM SERPL-MCNC: 4.4 MMOL/L — SIGNIFICANT CHANGE UP (ref 3.5–5.3)
POTASSIUM SERPL-SCNC: 4.4 MMOL/L — SIGNIFICANT CHANGE UP (ref 3.5–5.3)
PROT SERPL-MCNC: 5 G/DL — LOW (ref 6–8.3)
PROTHROM AB SERPL-ACNC: 12 SEC — SIGNIFICANT CHANGE UP (ref 10–12.9)
RBC # BLD: 2.4 M/UL — LOW (ref 4.2–5.8)
RBC # FLD: 14.3 % — SIGNIFICANT CHANGE UP (ref 10.3–14.5)
SODIUM SERPL-SCNC: 132 MMOL/L — LOW (ref 135–145)
WBC # BLD: 5.7 K/UL — SIGNIFICANT CHANGE UP (ref 3.8–10.5)
WBC # FLD AUTO: 5.7 K/UL — SIGNIFICANT CHANGE UP (ref 3.8–10.5)

## 2019-03-18 PROCEDURE — 83690 ASSAY OF LIPASE: CPT

## 2019-03-18 PROCEDURE — 85027 COMPLETE CBC AUTOMATED: CPT

## 2019-03-18 PROCEDURE — 96374 THER/PROPH/DIAG INJ IV PUSH: CPT | Mod: XU

## 2019-03-18 PROCEDURE — 99222 1ST HOSP IP/OBS MODERATE 55: CPT | Mod: GC

## 2019-03-18 PROCEDURE — 85730 THROMBOPLASTIN TIME PARTIAL: CPT

## 2019-03-18 PROCEDURE — 76705 ECHO EXAM OF ABDOMEN: CPT

## 2019-03-18 PROCEDURE — 84100 ASSAY OF PHOSPHORUS: CPT

## 2019-03-18 PROCEDURE — 99285 EMERGENCY DEPT VISIT HI MDM: CPT | Mod: 25

## 2019-03-18 PROCEDURE — 84484 ASSAY OF TROPONIN QUANT: CPT

## 2019-03-18 PROCEDURE — 96375 TX/PRO/DX INJ NEW DRUG ADDON: CPT

## 2019-03-18 PROCEDURE — 83036 HEMOGLOBIN GLYCOSYLATED A1C: CPT

## 2019-03-18 PROCEDURE — 82962 GLUCOSE BLOOD TEST: CPT

## 2019-03-18 PROCEDURE — 82977 ASSAY OF GGT: CPT

## 2019-03-18 PROCEDURE — 36415 COLL VENOUS BLD VENIPUNCTURE: CPT

## 2019-03-18 PROCEDURE — 99233 SBSQ HOSP IP/OBS HIGH 50: CPT | Mod: GC

## 2019-03-18 PROCEDURE — 83735 ASSAY OF MAGNESIUM: CPT

## 2019-03-18 PROCEDURE — 85025 COMPLETE CBC W/AUTO DIFF WBC: CPT

## 2019-03-18 PROCEDURE — 80053 COMPREHEN METABOLIC PANEL: CPT

## 2019-03-18 PROCEDURE — 74178 CT ABD&PLV WO CNTR FLWD CNTR: CPT

## 2019-03-18 PROCEDURE — 85610 PROTHROMBIN TIME: CPT

## 2019-03-18 RX ORDER — INSULIN GLARGINE 100 [IU]/ML
8 INJECTION, SOLUTION SUBCUTANEOUS
Qty: 300 | Refills: 0 | OUTPATIENT
Start: 2019-03-18 | End: 2019-04-16

## 2019-03-18 RX ORDER — METFORMIN HYDROCHLORIDE 850 MG/1
0 TABLET ORAL
Qty: 0 | Refills: 0 | COMMUNITY

## 2019-03-18 RX ORDER — INSULIN LISPRO 100/ML
3 VIAL (ML) SUBCUTANEOUS
Qty: 300 | Refills: 1 | OUTPATIENT
Start: 2019-03-18 | End: 2019-05-16

## 2019-03-18 RX ORDER — MAGNESIUM SULFATE 500 MG/ML
1 VIAL (ML) INJECTION ONCE
Qty: 0 | Refills: 0 | Status: COMPLETED | OUTPATIENT
Start: 2019-03-18 | End: 2019-03-18

## 2019-03-18 RX ORDER — INSULIN GLARGINE 100 [IU]/ML
10 INJECTION, SOLUTION SUBCUTANEOUS
Qty: 0 | Refills: 0 | COMMUNITY

## 2019-03-18 RX ORDER — ASPIRIN/CALCIUM CARB/MAGNESIUM 324 MG
81 TABLET ORAL DAILY
Qty: 0 | Refills: 0 | Status: DISCONTINUED | OUTPATIENT
Start: 2019-03-18 | End: 2019-03-18

## 2019-03-18 RX ADMIN — Medication 100 GRAM(S): at 09:43

## 2019-03-18 RX ADMIN — HEPARIN SODIUM 5000 UNIT(S): 5000 INJECTION INTRAVENOUS; SUBCUTANEOUS at 05:04

## 2019-03-18 RX ADMIN — Medication 1 PATCH: at 11:43

## 2019-03-18 RX ADMIN — Medication 81 MILLIGRAM(S): at 16:30

## 2019-03-18 RX ADMIN — SODIUM CHLORIDE 90 MILLILITER(S): 9 INJECTION, SOLUTION INTRAVENOUS at 11:43

## 2019-03-18 RX ADMIN — Medication 1 PATCH: at 11:00

## 2019-03-18 RX ADMIN — Medication 1 PATCH: at 07:25

## 2019-03-18 RX ADMIN — HEPARIN SODIUM 5000 UNIT(S): 5000 INJECTION INTRAVENOUS; SUBCUTANEOUS at 14:15

## 2019-03-18 RX ADMIN — Medication 5: at 17:07

## 2019-03-18 NOTE — CONSULT NOTE ADULT - ATTENDING COMMENTS
Pt seen on rounds prior to discharge.  His diabetes is more likely due to pancreatic insufficiency than to "typical" type 2 disease.  He gives a history of heavy alcohol use in the past, had one episode of acute pancreatitis, and has CT changes suggestive of chronic pancreatitis.  The diabetes in these pts is usually characterized by low basal requirements but a definite need for pre-meal boluses because of their marked insulinopenia.  (He was only taking Lantus at home and the dose was likely excessive).  He also has been unable to gain weight despite a good PO intake and gives a history of 6 bowel movements a day.  Though not a typical post-prandial pattern and without any abbey diarrhea, I suspect that he has pancreatic exocrine insufficiency as well  EXplained all of the above to the pt, and he seemed to understand perfectly.  Although would have liked to obtain stool studies to document fat malabsorption, he is being discharged today.  Would nonetheless empirically start him on Creon, probably 3 caps of Creon-12 at each meal.  Will also decrease the Lantus to 8 units, start pre-meal Humalog at 3 units TID.  Would have been happy to follow him as outpatient, but he does not think that he can make the trip to [facility] at this point.  He needs to see an endocrinologist, and also needs to see a gastroenterologist who can evaluate for exocrine insufficiency and pancreatic duct stenoses.  He may decided to seek care at [facility] since he lives in [place]

## 2019-03-18 NOTE — DISCHARGE NOTE NURSING/CASE MANAGEMENT/SOCIAL WORK - NSDCFUADDAPPT_GEN_ALL_CORE_FT
Please follow up with Dr. Cantor if your symptoms return or worsen. Call his office to schedule an appointment: [phone].    Please follow up with Dr. Felipe for better control of your diabetes. Call his office to schedule an appointment: [phone]

## 2019-03-18 NOTE — PROGRESS NOTE ADULT - ATTENDING COMMENTS
Needs Podiatry and Pulmonary follow up as outpatient, information written above, to be given to patient prior to discharge.  Rest as above.

## 2019-03-18 NOTE — DISCHARGE NOTE PROVIDER - CARE PROVIDER_API CALL
Gianni Cantor)  Surgery  [address] [address]  Phone: [phone]  Fax: [phone]  Follow Up Time:     Petr Felipe)  EndocrinologyMetabDiabetes; Internal Medicine  [address] [address]  Phone: [phone]  Fax: [phone]  Follow Up Time:

## 2019-03-18 NOTE — PROGRESS NOTE ADULT - ASSESSMENT
73M PMH CAD S/p stent, HTN, DM, s/p laparoscopic cholecystectomy on 2/26 for symptomatic cholelithiasis, a/w abdominal pain and fluid collection in RUQ.     pain/nausea control  IS/OOB  low fat diet, NPO @ [facility] for ERCP  ISS, Endo consult for further recs  SCDs, HSQ

## 2019-03-18 NOTE — PROGRESS NOTE ADULT - SUBJECTIVE AND OBJECTIVE BOX
SUBJECTIVE:   No acute events overnight.   Denies pain/nausea/vomiting  Currently NPO  +Bowel movements, +Flatus  Encouraged out of bed ambulating, incentive spirometry    ---------------------------------------------------------------    Vital Signs Last 24 Hrs  T(C): 36.2 (18 Mar 2019 08:30), Max: 36.8 (17 Mar 2019 21:19)  T(F): 97.2 (18 Mar 2019 08:30), Max: 98.2 (17 Mar 2019 21:19)  HR: 83 (18 Mar 2019 08:30) (78 - 98)  BP: 131/79 (18 Mar 2019 08:30) (112/69 - 131/79)  BP(mean): --  RR: 17 (18 Mar 2019 08:30) (17 - 17)  SpO2: 98% (18 Mar 2019 08:30) (98% - 100%)    I&O's Summary    17 Mar 2019 07:01  -  18 Mar 2019 07:00  --------------------------------------------------------  IN: 3840 mL / OUT: 250 mL / NET: 3590 mL    18 Mar 2019 07:01  -  18 Mar 2019 10:11  --------------------------------------------------------  IN: 590 mL / OUT: 0 mL / NET: 590 mL        ----------------------------------------------------------------    Physical Exam:  General: NAD, Comfortable in bed     Neuro: A&Ox3  Respiratory: nonlabored breathing, no respiratory distress    CV:  RRR  Abd: soft, nontender, nondistended, incisions clean dry intact, echymosis surrounding umbilicus, R flank and R side of abdomen  : voiding  Extremities: WWP, nonedematous, SCDs in place    -------------------------------------------------------------------    LABS:                        8.2    5.70  )-----------( 193      ( 18 Mar 2019 06:49 )             25.5     03-18    132<L>  |  106  |  16  ----------------------------<  129<H>  4.4   |  21<L>  |  0.61    Ca    7.7<L>      18 Mar 2019 06:49  Phos  2.6     03-18  Mg     1.7     03-18    TPro  5.0<L>  /  Alb  2.6<L>  /  TBili  0.3  /  DBili  x   /  AST  59<H>  /  ALT  44  /  AlkPhos  296<H>  03-18    PT/INR - ( 18 Mar 2019 06:49 )   PT: 12.0 sec;   INR: 1.06          PTT - ( 18 Mar 2019 06:49 )  PTT:66.2 sec        RADIOLOGY & ADDITIONAL STUDIES:

## 2019-03-18 NOTE — DISCHARGE NOTE NURSING/CASE MANAGEMENT/SOCIAL WORK - NSDCDPATPORTLINK_GEN_ALL_CORE
You can access the [facility], offered by [facility], by registering with the following website: [email or web address]

## 2019-03-18 NOTE — PROGRESS NOTE ADULT - ASSESSMENT
73 Year old, M, PMH of CAD S/p stent, HTN, DM, S/p Lap tila on 2/26 for symptomatic gall stone, present with abdominal pain and elevated ALP. Pain improving, ALP down trending. Plan was for pt to undergo ERCP to evaluation for biloma, but in the setting of improving clinical symptoms and downtrending ALP, will hold off at this time. Elevation may be secondary to post surgical changes.     Elevated ALP  -no fever, vss, wbc normal  -ALP, AST downtrending   -US abdomen: normal CBD at 7mm, mild IHB dilatation, CT abdomen, no collection in GB fossa, 2.4x 2.5 collection in the anterior abdominal wall  -consider IR guided aspiration of the abdominal collection to determine of serous vs bilious   -Will hold off on ERCP as this as per discussion with Dr. Cantor     Case d/w Dr. Sheppard

## 2019-03-18 NOTE — PROGRESS NOTE ADULT - SUBJECTIVE AND OBJECTIVE BOX
INTERVAL HPI/OVERNIGHT EVENTS:  Patient was seen and examined at bedside. As per nurse and patient, no o/n events, patient resting comfortably. No complaints at this time, states has cough every mornign and throughout the day. States can only walk a block or two slowly prior to becoming SOB, has not taken anything for emphysema or ever been tested for COPD. Has had LE swelling to the ankle intermittently at the end of the day but otherwise no other swelling. Patient denies: fever, chills, dizziness, weakness, HA, Changes in vision, CP, palpitations, N/V/D/C, dysuria, changes in bowel movements. ROS otherwise negative.    VITAL SIGNS:  T(F): 97.2 (03-18-19 @ 08:30)  HR: 83 (03-18-19 @ 08:30)  BP: 131/79 (03-18-19 @ 08:30)  RR: 17 (03-18-19 @ 08:30)  SpO2: 98% (03-18-19 @ 08:30)  Wt(kg): --    PHYSICAL EXAM:    Constitutional: WDWN, NAD  HEENT: PERRL, EOMI, sclera non-icteric, neck supple, trachea midline, no masses, no JVD, MMM, good dentition  Respiratory: CTA b/l, decreased breath sounds Bilateral bases no wheezing, no rhonchi, no rales, without accessory muscle use and no intercostal retractions  Cardiovascular: RRR, normal S1S2, no M/R/G  Gastrointestinal: soft, NTND, no masses palpable, BS normal  Extremities: Warm, well perfused, pulses equal bilateral upper and lower extremities, no edema, no clubbing  Neurological: AAOx3, CN Grossly intact  Skin: Normal temperature, warm, dry    MEDICATIONS  (STANDING):  dextrose 5%. 1000 milliLiter(s) (50 mL/Hr) IV Continuous <Continuous>  dextrose 50% Injectable 12.5 Gram(s) IV Push once  dextrose 50% Injectable 25 Gram(s) IV Push once  dextrose 50% Injectable 25 Gram(s) IV Push once  heparin  Injectable 5000 Unit(s) SubCutaneous every 8 hours  insulin glargine Injectable (LANTUS) 10 Unit(s) SubCutaneous at bedtime  insulin lispro (HumaLOG) corrective regimen sliding scale   SubCutaneous Before meals and at bedtime  lactated ringers. 1000 milliLiter(s) (90 mL/Hr) IV Continuous <Continuous>  nicotine - 21 mG/24Hr(s) Patch 1 patch Transdermal daily    MEDICATIONS  (PRN):  dextrose 40% Gel 15 Gram(s) Oral once PRN Blood Glucose LESS THAN 70 milliGRAM(s)/deciliter  docusate sodium 100 milliGRAM(s) Oral three times a day PRN Constipation  glucagon  Injectable 1 milliGRAM(s) IntraMuscular once PRN Glucose LESS THAN 70 milligrams/deciliter  ondansetron Injectable 4 milliGRAM(s) IV Push every 6 hours PRN Nausea and/or Vomiting  senna 2 Tablet(s) Oral at bedtime PRN Constipation      Allergies    No Known Allergies    Intolerances        LABS:                        8.2    5.70  )-----------( 193      ( 18 Mar 2019 06:49 )             25.5     03-18    132<L>  |  106  |  16  ----------------------------<  129<H>  4.4   |  21<L>  |  0.61    Ca    7.7<L>      18 Mar 2019 06:49  Phos  2.6     03-18  Mg     1.7     03-18    TPro  5.0<L>  /  Alb  2.6<L>  /  TBili  0.3  /  DBili  x   /  AST  59<H>  /  ALT  44  /  AlkPhos  296<H>  03-18    PT/INR - ( 18 Mar 2019 06:49 )   PT: 12.0 sec;   INR: 1.06          PTT - ( 18 Mar 2019 06:49 )  PTT:66.2 sec      RADIOLOGY & ADDITIONAL TESTS:  Reviewed

## 2019-03-18 NOTE — DISCHARGE NOTE PROVIDER - NSDCCPCAREPLAN_GEN_ALL_CORE_FT
PRINCIPAL DISCHARGE DIAGNOSIS  Diagnosis: Abdominal pain  Assessment and Plan of Treatment: Feared condition not demonstrated

## 2019-03-18 NOTE — CONSULT NOTE ADULT - SUBJECTIVE AND OBJECTIVE BOX
*Incomplete note*    HPI:     73 year old male PMH of CAD with stent (over 10 years ago, per patient stent is no longer patent and had procedures to try to open it up but now is undergoing medical management), HTN, DM, GERD, emphysema, L hernia surgery, lap tila on 2/26 for symptomatic gallstone, who presented with abdominal pain, mainly epigastric, RUQ and RLQ without radiation.  He also states that the pain was worse with standing (resolved with sitting or laying down) and described as dull, 10/10 with 4 episodes of NBNB vomiting and more frequent bowel movements but not diarrhea.  He has been able to tolerate food.  He states that he called Dr. Cantor's office to try to see him and the quickest way to do so would be through the ED so he presented to the ED.  Patient found to have fluid collection in RUQ, going for MRCP 3/18.  Additionally, the patient states that he has had 2 weeks of sore throat with difficulty swallowing and bilateral fungus on both of his feet.    Age at Dx:  How dx:  Hx and duration of insulin:  Current Therapy:  Hx of hypoglycemia  Hx of DKA/HHS?    Home FSG:  Fasting  Lunch  Dinner  Bed    Hx of other regimens  Complications:  Outpatient Endo:    PMH & Surgical Hx:ABD PAIN  Handoff  MEWS Score  Pancreatitis  Diabetes  Emphysema of lung  Abdominal pain  Hyponatremia  Hypomagnesemia  Anemia  Nail fungal infection  Dysphagia  Diabetes mellitus  History of cholecystectomy  ABD PAIN  23      FH:  DM:  Thyroid:  Autoimmune:  Other:    SH:  Smoking  Etoh:  Recreational Drugs:  Social Life:    Current Meds:  dextrose 40% Gel 15 Gram(s) Oral once PRN  dextrose 5%. 1000 milliLiter(s) IV Continuous <Continuous>  dextrose 50% Injectable 12.5 Gram(s) IV Push once  dextrose 50% Injectable 25 Gram(s) IV Push once  dextrose 50% Injectable 25 Gram(s) IV Push once  docusate sodium 100 milliGRAM(s) Oral three times a day PRN  glucagon  Injectable 1 milliGRAM(s) IntraMuscular once PRN  heparin  Injectable 5000 Unit(s) SubCutaneous every 8 hours  insulin glargine Injectable (LANTUS) 10 Unit(s) SubCutaneous at bedtime  insulin lispro (HumaLOG) corrective regimen sliding scale   SubCutaneous Before meals and at bedtime  lactated ringers. 1000 milliLiter(s) IV Continuous <Continuous>  magnesium sulfate  IVPB 1 Gram(s) IV Intermittent once  nicotine - 21 mG/24Hr(s) Patch 1 patch Transdermal daily  ondansetron Injectable 4 milliGRAM(s) IV Push every 6 hours PRN  senna 2 Tablet(s) Oral at bedtime PRN      Allergies:  No Known Allergies      ROS:  Denies the following except as indicated.    General: weight loss/weight gain, decreased appetite, fatigue  Eyes: Blurry vision, double vision, visual changes  ENT: Throat pain, changes in voice,   CV: palpitations, SOB, CP, cough  GI: NVD, difficulty swallowing, abdominal pain  : polyuria, dysuria  Endo: abnormal menses, temperature intolerance, decreased libido  MSK: weakness, joint pain  Skin: rash, dryness, diaphoresis  Heme: Easy bruising,bleeding  Neuro: HA, dizziness, lightheadedness, numbness tingling  Psych: Anxiety, Depression    Vital Signs Last 24 Hrs  T(C): 36.2 (18 Mar 2019 08:30), Max: 36.8 (17 Mar 2019 21:19)  T(F): 97.2 (18 Mar 2019 08:30), Max: 98.2 (17 Mar 2019 21:19)  HR: 83 (18 Mar 2019 08:30) (78 - 98)  BP: 131/79 (18 Mar 2019 08:30) (112/69 - 131/79)  BP(mean): --  RR: 17 (18 Mar 2019 08:30) (17 - 17)  SpO2: 98% (18 Mar 2019 08:30) (98% - 100%)  Height (cm): 170.2 (03-16 @ 05:15)  Weight (kg): 50.349 (03-16 @ 05:15)  BMI (kg/m2): 17.4 (03-16 @ 05:15)      Constitutional: wn/wd in NAD.   HEENT: NCAT, MMM, OP clear, EOMI, , no proptosis or lid retraction  Neck: no thyromegaly or palpable thyroid nodules   Respiratory: lungs CTAB.  Cardiovascular: regular rhythm, normal S1 and S2, no audible murmurs, no peripheral edema  GI: soft, NT/ND, no masses/HSM appreciated.  Neurology: no tremors, DTR 2+  Skin: no visible rashes/lesions  Psychiatric: AAO x 3, normal affect/mood.  Ext: radial pulses intact, DP pulses intact, extremities warm, no cyanosis, clubbing or edema.       LABS:                        8.2    5.70  )-----------( 193      ( 18 Mar 2019 06:49 )             25.5     03-18    132<L>  |  106  |  16  ----------------------------<  129<H>  4.4   |  21<L>  |  0.61    Ca    7.7<L>      18 Mar 2019 06:49  Phos  2.6     03-18  Mg     1.7     03-18    TPro  5.0<L>  /  Alb  2.6<L>  /  TBili  0.3  /  DBili  x   /  AST  59<H>  /  ALT  44  /  AlkPhos  296<H>  03-18    PT/INR - ( 18 Mar 2019 06:49 )   PT: 12.0 sec;   INR: 1.06          PTT - ( 18 Mar 2019 06:49 )  PTT:66.2 sec    Hemoglobin A1C, Whole Blood: 7.8 (03-16 @ 11:02)        RADIOLOGY & ADDITIONAL STUDIES:  CAPILLARY BLOOD GLUCOSE      POCT Blood Glucose.: 135 mg/dL (18 Mar 2019 07:22)  POCT Blood Glucose.: 237 mg/dL (17 Mar 2019 22:03)  POCT Blood Glucose.: 213 mg/dL (17 Mar 2019 17:04)  POCT Blood Glucose.: 336 mg/dL (17 Mar 2019 12:23)        A/P:73y Male    1.  DM  Please continue lantus       units at night / morning.  Please continue lispro      units before each meal.  Please continue lispro moderate / low dose sliding scale four times daily with meals and at bedtime    Pt's fingerstick glucose goal is     Will continue to monitor     For discharge, pt can continue    Pt can follow up at discharge with [facility] by calling  to make an appointment.   Will discuss case with     and update primary team *Incomplete note*    HPI:     73 year old male PMH of CAD with stent (over 10 years ago, per patient stent is no longer patent and had procedures to try to open it up but now is undergoing medical management), HTN, DM, GERD, emphysema, L hernia surgery, lap tila on 2/26 for symptomatic gallstone, who presented with abdominal pain, mainly epigastric, RUQ and RLQ without radiation.  He also states that the pain was worse with standing (resolved with sitting or laying down) and described as dull, 10/10 with 4 episodes of NBNB vomiting and more frequent bowel movements but not diarrhea.  He has been able to tolerate food.  He states that he called Dr. Cantor's office to try to see him and the quickest way to do so would be through the ED so he presented to the ED.  Patient found to have fluid collection in RUQ, going for MRCP 3/18.  Additionally, the patient states that he has had 2 weeks of sore throat with difficulty swallowing and bilateral fungus on both of his feet.    Regarding his DM, pt was diagnosed 5-6 years ago when routine bloodwork revealed elevated sugars, and pt was started on insulin. He currently uses 10units of lantus qhs, but will sometimes go up to 12 units if his fingersticks are running high. He checks his fingerstick daily and stated it ranges from . He has had episodes of hypoglycemia, during which he becomes diaphoretic and disoriented. He denies any history of DKA or HHS. His diet is heavy in starches and meats, and low in vegetables and sugars. He endorses recent increase in frequency of both urination and defecation (fully formed, not diarrhea), decrease in visual acuity, as well as intermittent tingling of both hands and swelling of both lower extremities (L>R).    Age at Dx: 68  How dx: Routine labs  Hx and duration of insulin: Since time of diagnosis, has been as high as 14units, currently on 10 units lantus  Current Therapy: 10 units lantus   Hx of hypoglycemia: Yes, but not hospitalized  Hx of DKA/HHS? No    Home FSG:  Lunch:     Hx of other regimens: none  Complications: none  Outpatient Endo: Dr. Bustos    PMH & Surgical Hx:ABD PAIN  Handoff  MEWS Score  Pancreatitis  Diabetes  Emphysema of lung  Abdominal pain  Hyponatremia  Hypomagnesemia  Anemia  Nail fungal infection  Dysphagia  Diabetes mellitus  History of cholecystectomy  ABD PAIN  23      FH:  DM:  Thyroid:  Autoimmune:  Other:    SH:  Smoking  Etoh:  Recreational Drugs:  Social Life:    Current Meds:  dextrose 40% Gel 15 Gram(s) Oral once PRN  dextrose 5%. 1000 milliLiter(s) IV Continuous <Continuous>  dextrose 50% Injectable 12.5 Gram(s) IV Push once  dextrose 50% Injectable 25 Gram(s) IV Push once  dextrose 50% Injectable 25 Gram(s) IV Push once  docusate sodium 100 milliGRAM(s) Oral three times a day PRN  glucagon  Injectable 1 milliGRAM(s) IntraMuscular once PRN  heparin  Injectable 5000 Unit(s) SubCutaneous every 8 hours  insulin glargine Injectable (LANTUS) 10 Unit(s) SubCutaneous at bedtime  insulin lispro (HumaLOG) corrective regimen sliding scale   SubCutaneous Before meals and at bedtime  lactated ringers. 1000 milliLiter(s) IV Continuous <Continuous>  magnesium sulfate  IVPB 1 Gram(s) IV Intermittent once  nicotine - 21 mG/24Hr(s) Patch 1 patch Transdermal daily  ondansetron Injectable 4 milliGRAM(s) IV Push every 6 hours PRN  senna 2 Tablet(s) Oral at bedtime PRN      Allergies:  No Known Allergies      ROS:  Denies the following except as indicated.    General: weight loss/weight gain, decreased appetite, fatigue  ENT: Throat pain, changes in voice,   CV: palpitations, SOB, CP, cough  GI: NVD, difficulty swallowing,  : dysuria  Endo: temperature intolerance, decreased libido  MSK: weakness, joint pain  Skin: rash, dryness, diaphoresis  Heme: Easy bruising, bleeding  Neuro: HA, dizziness, lightheadedness, numbness tingling  Psych: Anxiety, Depression    Vital Signs Last 24 Hrs  T(C): 36.2 (18 Mar 2019 08:30), Max: 36.8 (17 Mar 2019 21:19)  T(F): 97.2 (18 Mar 2019 08:30), Max: 98.2 (17 Mar 2019 21:19)  HR: 83 (18 Mar 2019 08:30) (78 - 98)  BP: 131/79 (18 Mar 2019 08:30) (112/69 - 131/79)  BP(mean): --  RR: 17 (18 Mar 2019 08:30) (17 - 17)  SpO2: 98% (18 Mar 2019 08:30) (98% - 100%)  Height (cm): 170.2 (03-16 @ 05:15)  Weight (kg): 50.349 (03-16 @ 05:15)  BMI (kg/m2): 17.4 (03-16 @ 05:15)      Constitutional: WDWN, in NAD  Head: NC/AT  Eyes: PERRL, EOMI, anicteric sclera  ENT: no nasal discharge; uvula midline, no oropharyngeal erythema or exudates; MMM  Neck: supple; no JVD or thyromegaly  Respiratory: CTA B/L; no W/R/R, no retractions  Cardiac: +S1/S2; RRR; no M/R/G; PMI non-displaced  Gastrointestinal: abdomen soft, non distended, tender to palpation of epigastric region and RUQ/RLQ, ecchymoses throughout abdomen   Extremities: WWP, no clubbing or cyanosis; trace peripheral edema, fungal infection of bilateral feet  Musculoskeletal: NROM x4; no joint swelling, tenderness or erythema  Vascular: 2+ radial, femoral, DP/PT pulses B/L  Dermatologic: skin warm, dry and intact; no rashes, wounds, or scars  Neurologic: AAOx3; CNII-XII grossly intact; no focal deficits        LABS:                        8.2    5.70  )-----------( 193      ( 18 Mar 2019 06:49 )             25.5     03-18    132<L>  |  106  |  16  ----------------------------<  129<H>  4.4   |  21<L>  |  0.61    Ca    7.7<L>      18 Mar 2019 06:49  Phos  2.6     03-18  Mg     1.7     03-18    TPro  5.0<L>  /  Alb  2.6<L>  /  TBili  0.3  /  DBili  x   /  AST  59<H>  /  ALT  44  /  AlkPhos  296<H>  03-18    PT/INR - ( 18 Mar 2019 06:49 )   PT: 12.0 sec;   INR: 1.06          PTT - ( 18 Mar 2019 06:49 )  PTT:66.2 sec    Hemoglobin A1C, Whole Blood: 7.8 (03-16 @ 11:02)        RADIOLOGY & ADDITIONAL STUDIES:  CAPILLARY BLOOD GLUCOSE      POCT Blood Glucose.: 135 mg/dL (18 Mar 2019 07:22)  POCT Blood Glucose.: 237 mg/dL (17 Mar 2019 22:03)  POCT Blood Glucose.: 213 mg/dL (17 Mar 2019 17:04)  POCT Blood Glucose.: 336 mg/dL (17 Mar 2019 12:23)        A/P:73y Male    1.  DM  Please continue lantus       units at night / morning.  Please continue lispro      units before each meal.  Please continue lispro moderate / low dose sliding scale four times daily with meals and at bedtime    Pt's fingerstick glucose goal is     Will continue to monitor     For discharge, pt can continue    Pt can follow up at discharge with [facility] by calling  to make an appointment.   Will discuss case with     and update primary team HPI:     73 year old male PMH of CAD with stent (over 10 years ago, per patient stent is no longer patent and had procedures to try to open it up but now is undergoing medical management), HTN, DM, GERD, emphysema, L hernia surgery, lap tila on  for symptomatic gallstone, who presented with abdominal pain, mainly epigastric, RUQ and RLQ without radiation.  He also states that the pain was worse with standing (resolved with sitting or laying down) and described as dull, 10/10 with 4 episodes of NBNB vomiting and more frequent bowel movements but not diarrhea.  He has been able to tolerate food.  He states that he called Dr. Cantor's office to try to see him and the quickest way to do so would be through the ED so he presented to the ED.  Patient found to have fluid collection in RUQ, going for MRCP 3/18.  Additionally, the patient states that he has had 2 weeks of sore throat with difficulty swallowing and bilateral fungus on both of his feet.    Regarding his DM, pt was diagnosed 5-6 years ago when routine bloodwork revealed elevated sugars, and pt was started on insulin. He currently uses 10units of lantus qhs, but will sometimes go up to 12 units if his fingersticks are running high. He checks his fingerstick daily and stated it ranges from . He has had episodes of hypoglycemia, during which he becomes diaphoretic and disoriented. He denies any history of DKA or HHS. His diet is heavy in starches and meats, and low in vegetables and sugars. He endorses recent increase in frequency of both urination and defecation (fully formed, not diarrhea), decrease in visual acuity, as well as intermittent tingling of both hands and swelling of both lower extremities (L>R).    Age at Dx: 68  How dx: Routine labs  Hx and duration of insulin: Since time of diagnosis, has been as high as 14units, currently on 10 units lantus  Current Therapy: 10 units lantus   Hx of hypoglycemia: Yes, but not hospitalized  Hx of DKA/HHS? No    Home FSG:  Lunch:     Hx of other regimens: none  Complications: none  Outpatient Endo: Dr. Bustos    PMH & Surgical Hx:ABD PAIN  Handoff  MEWS Score  Pancreatitis  Diabetes  Emphysema of lung  Abdominal pain  Hyponatremia  Hypomagnesemia  Anemia  Nail fungal infection  Dysphagia  Diabetes mellitus  History of cholecystectomy  ABD PAIN  23      FH: None in 1st degree relative  DM:  Thyroid:  Autoimmune:  Other:    SH:  Smokin ppd for 45 years  Etoh: 2 12 oz beers every other day  Recreational Drugs: denies  Social Life:    Current Meds:  dextrose 40% Gel 15 Gram(s) Oral once PRN  dextrose 5%. 1000 milliLiter(s) IV Continuous <Continuous>  dextrose 50% Injectable 12.5 Gram(s) IV Push once  dextrose 50% Injectable 25 Gram(s) IV Push once  dextrose 50% Injectable 25 Gram(s) IV Push once  docusate sodium 100 milliGRAM(s) Oral three times a day PRN  glucagon  Injectable 1 milliGRAM(s) IntraMuscular once PRN  heparin  Injectable 5000 Unit(s) SubCutaneous every 8 hours  insulin glargine Injectable (LANTUS) 10 Unit(s) SubCutaneous at bedtime  insulin lispro (HumaLOG) corrective regimen sliding scale   SubCutaneous Before meals and at bedtime  lactated ringers. 1000 milliLiter(s) IV Continuous <Continuous>  magnesium sulfate  IVPB 1 Gram(s) IV Intermittent once  nicotine - 21 mG/24Hr(s) Patch 1 patch Transdermal daily  ondansetron Injectable 4 milliGRAM(s) IV Push every 6 hours PRN  senna 2 Tablet(s) Oral at bedtime PRN      Allergies:  No Known Allergies      ROS:  Denies the following except as indicated.    General: weight loss/weight gain, decreased appetite, fatigue  ENT: Throat pain, changes in voice,   CV: palpitations, SOB, CP, cough  GI: NVD, difficulty swallowing,  : dysuria  Endo: temperature intolerance, decreased libido  MSK: weakness, joint pain  Skin: rash, dryness, diaphoresis  Heme: Easy bruising, bleeding  Neuro: HA, dizziness, lightheadedness, numbness tingling  Psych: Anxiety, Depression    Vital Signs Last 24 Hrs  T(C): 36.2 (18 Mar 2019 08:30), Max: 36.8 (17 Mar 2019 21:19)  T(F): 97.2 (18 Mar 2019 08:30), Max: 98.2 (17 Mar 2019 21:19)  HR: 83 (18 Mar 2019 08:30) (78 - 98)  BP: 131/79 (18 Mar 2019 08:30) (112/69 - 131/79)  BP(mean): --  RR: 17 (18 Mar 2019 08:30) (17 - 17)  SpO2: 98% (18 Mar 2019 08:30) (98% - 100%)  Height (cm): 170.2 ( @ 05:15)  Weight (kg): 50.349 ( @ 05:15)  BMI (kg/m2): 17.4 ( @ 05:15)      Constitutional: WDWN, in NAD  Head: NC/AT  Eyes: PERRL, EOMI, anicteric sclera  ENT: no nasal discharge; uvula midline, no oropharyngeal erythema or exudates; MMM  Neck: supple; no JVD or thyromegaly  Respiratory: CTA B/L; no W/R/R, no retractions  Cardiac: +S1/S2; RRR; no M/R/G; PMI non-displaced  Gastrointestinal: abdomen soft, non distended, tender to palpation of epigastric region and RUQ/RLQ, ecchymoses throughout abdomen   Extremities: WWP, no clubbing or cyanosis; trace peripheral edema, fungal infection of bilateral feet  Musculoskeletal: NROM x4; no joint swelling, tenderness or erythema  Vascular: 2+ radial, femoral, DP/PT pulses B/L  Dermatologic: skin warm, dry and intact; no rashes, wounds, or scars  Neurologic: AAOx3; CNII-XII grossly intact; no focal deficits        LABS:                        8.2    5.70  )-----------( 193      ( 18 Mar 2019 06:49 )             25.5     03-18    132<L>  |  106  |  16  ----------------------------<  129<H>  4.4   |  21<L>  |  0.61    Ca    7.7<L>      18 Mar 2019 06:49  Phos  2.6     03-18  Mg     1.7     -18    TPro  5.0<L>  /  Alb  2.6<L>  /  TBili  0.3  /  DBili  x   /  AST  59<H>  /  ALT  44  /  AlkPhos  296<H>  03-18    PT/INR - ( 18 Mar 2019 06:49 )   PT: 12.0 sec;   INR: 1.06          PTT - ( 18 Mar 2019 06:49 )  PTT:66.2 sec    Hemoglobin A1C, Whole Blood: 7.8 ( @ 11:02)        RADIOLOGY & ADDITIONAL STUDIES:  CAPILLARY BLOOD GLUCOSE      POCT Blood Glucose.: 135 mg/dL (18 Mar 2019 07:22)  POCT Blood Glucose.: 237 mg/dL (17 Mar 2019 22:03)  POCT Blood Glucose.: 213 mg/dL (17 Mar 2019 17:04)  POCT Blood Glucose.: 336 mg/dL (17 Mar 2019 12:23)        A/P: 73 year old male PMH of CAD with stent (over 10 years ago, per patient stent is no longer patent and had procedures to try to open it up but no is undergoing medical management), HTN, DM, GERD, emphysema, L hernia surgery, lap tila on  for symptomatic gallstone, who presented with abdominal pain, mainly epigastric, RUQ and RLQ without radiation found to have fluid collection,    1.  DM    For discharge, given persistently elevated sugars and need for tighter post-prandial control, would decrease lantus to 8 units at night. Would add premeal insulin, 3 units of humalog before every meal. Would start on sliding scale, with 1 unit of coverage insulin for every 50 mg/dl increase over 200.  - gave pt information for diabetes clinic close to his home as well as [facility] endocrinology  - recommended pt see a GI pancreatic specialist given likely pancreatic insufficiency    Pt can follow up at discharge with [facility] by calling  to make an appointment.   Will discuss case with Dr. Felipe and update primary team

## 2019-03-18 NOTE — CONSULT NOTE ADULT - CONSULT REASON
ruq pain
Abdominal pain S/p Lap tila
Uncontrolled blood glucose, medical management
Diabetes management

## 2019-03-18 NOTE — DISCHARGE NOTE PROVIDER - NSDCFUADDAPPT_GEN_ALL_CORE_FT
Please follow up with Dr. Cantor if your symptoms return or worsen. Call his office to schedule an appointment: [phone].    Please follow up with Dr. Felipe for better control of your diabetes. Call his office to schedule an appointment: [phone]

## 2019-03-18 NOTE — PROGRESS NOTE ADULT - ASSESSMENT
73 year old male PMH of CAD with stent (over 10 years ago, per patient stent is no longer patent and had procedures to try to open it up but now is undergoing medical management), HTN, DM, GERD, emphysema, L hernia surgery, lap tila on 2/26 for symptomatic gallstone, who presented with abdominal pain, mainly epigastric, RUQ and RLQ without radiation found to have fluid collection, medicine consulted for hyperglycemia and medical comorbidities        #Diabetes mellitus - Patient with history of diabetes mellitus, takes lantus 10-12 units at home in the morning based on blood glucose levels, has had severe hypoglycemia with lantus 14, with blood glucose levels between .   - Would recommend continue lantus 10 units at bedtime, sliding scale, consistent carbohydrate diet  - Follow up endocrinology recommendations  - Likely patient will require premeal insulin or oral hypoglycemic on discharge, follow up endocrinology recs    # Dysphagia - Patient with sore throat and difficulty swallowing for 2 weeks. Does endorse 45 pack year smoking history.  Unable to visualize entire throat, would recommend ENT evaluation.    #Emphysema - Patient with hx of emphysema in the past, has never taken inhalers in the past for symptoms, has never had PFT test to diagnose underlying lung disease  - On discharge, would make appointment with pulmonology - [facility] [address] [place] [address] [address]; [phone]    #Current smoker - on nicotine patch, 45pack year smoking history   - Has never had screening for AAA or low dose ct chest. Given weight loss over the past 2-3 years of abotu 50 pounds would consider malignany workup.     #Onychomycosis, foot swelling  -Patient with fungal nail infection of bilateral lower extremities.  Would recommend podiatry consultation as an outpatient: Address: [address] [place] [address] [address], [place] [id number] Phone: [phone]    #Hyponatremia -Patient found to have Na of 132 this AM, improving from 130 on fluids  - would obtain serum osm, urine osm and urine Na in order to determine underlying cause, continue to monitor closely    #Macrocytic Anemia - Patient found to have macrocytic anemia on this admission with hemoglobin of 8.5 and MCV of 104.5.  No signs/symptoms of active bleeding, patient hemodynamically stable.    - Maintain active type and screen, Transfuse for Hgb<7 or symptomatic anemia  - Obtain B12 and folate level    #DIspo: As per primary team

## 2019-03-18 NOTE — PROGRESS NOTE ADULT - SUBJECTIVE AND OBJECTIVE BOX
Update note:     Pt scheduled for ERCP today to evaluate for 2 cm collection in the setting of elevated ALP. Discussed case with Dr. Cantor and given pts multiple co-morbidities in the setting of improving ALP, will hold of on ERCP at this time and continue to monitor.           MEDICATIONS:  MEDICATIONS  (STANDING):  dextrose 5%. 1000 milliLiter(s) (50 mL/Hr) IV Continuous <Continuous>  dextrose 50% Injectable 12.5 Gram(s) IV Push once  dextrose 50% Injectable 25 Gram(s) IV Push once  dextrose 50% Injectable 25 Gram(s) IV Push once  heparin  Injectable 5000 Unit(s) SubCutaneous every 8 hours  insulin glargine Injectable (LANTUS) 10 Unit(s) SubCutaneous at bedtime  insulin lispro (HumaLOG) corrective regimen sliding scale   SubCutaneous Before meals and at bedtime  lactated ringers. 1000 milliLiter(s) (90 mL/Hr) IV Continuous <Continuous>  nicotine - 21 mG/24Hr(s) Patch 1 patch Transdermal daily    MEDICATIONS  (PRN):  dextrose 40% Gel 15 Gram(s) Oral once PRN Blood Glucose LESS THAN 70 milliGRAM(s)/deciliter  docusate sodium 100 milliGRAM(s) Oral three times a day PRN Constipation  glucagon  Injectable 1 milliGRAM(s) IntraMuscular once PRN Glucose LESS THAN 70 milligrams/deciliter  ondansetron Injectable 4 milliGRAM(s) IV Push every 6 hours PRN Nausea and/or Vomiting  senna 2 Tablet(s) Oral at bedtime PRN Constipation      Allergies    No Known Allergies    Intolerances        Vital Signs Last 24 Hrs  T(C): 36.3 (18 Mar 2019 13:30), Max: 36.8 (17 Mar 2019 21:19)  T(F): 97.3 (18 Mar 2019 13:30), Max: 98.2 (17 Mar 2019 21:19)  HR: 100 (18 Mar 2019 13:30) (78 - 100)  BP: 115/75 (18 Mar 2019 13:30) (112/69 - 131/79)  BP(mean): --  RR: 16 (18 Mar 2019 13:30) (16 - 17)  SpO2: 99% (18 Mar 2019 13:30) (98% - 100%)    03-17 @ 07:01  -  03-18 @ 07:00  --------------------------------------------------------  IN: 3840 mL / OUT: 250 mL / NET: 3590 mL    03-18 @ 07:01  -  03-18 @ 13:34  --------------------------------------------------------  IN: 590 mL / OUT: 0 mL / NET: 590 mL        LABS:      CBC Full  -  ( 18 Mar 2019 06:49 )  WBC Count : 5.70 K/uL  Hemoglobin : 8.2 g/dL  Hematocrit : 25.5 %  Platelet Count - Automated : 193 K/uL  Mean Cell Volume : 106.3 fl  Mean Cell Hemoglobin : 34.2 pg  Mean Cell Hemoglobin Concentration : 32.2 gm/dL  Auto Neutrophil # : x  Auto Lymphocyte # : x  Auto Monocyte # : x  Auto Eosinophil # : x  Auto Basophil # : x  Auto Neutrophil % : x  Auto Lymphocyte % : x  Auto Monocyte % : x  Auto Eosinophil % : x  Auto Basophil % : x    03-18    132<L>  |  106  |  16  ----------------------------<  129<H>  4.4   |  21<L>  |  0.61    Ca    7.7<L>      18 Mar 2019 06:49  Phos  2.6     03-18  Mg     1.7     03-18    TPro  5.0<L>  /  Alb  2.6<L>  /  TBili  0.3  /  DBili  x   /  AST  59<H>  /  ALT  44  /  AlkPhos  296<H>  03-18    PT/INR - ( 18 Mar 2019 06:49 )   PT: 12.0 sec;   INR: 1.06          PTT - ( 18 Mar 2019 06:49 )  PTT:66.2 sec                  RADIOLOGY & ADDITIONAL STUDIES (The following images were personally reviewed):  < from: CT Abdomen and Pelvis w/ Oral Cont and w/wo IV Cont (03.16.19 @ 01:43) >    EXAM:  CT ABDOMEN AND PELVIS [facility]                          PROCEDURE DATE:  03/16/2019          INTERPRETATION:  CT of the ABDOMEN and PELVIS with intravenous contrast   dated 3/16/2019 1:43 AM    INDICATION: Right upper quadrant pain status post laparoscopic   cholecystectomy.    TECHNIQUE: CT of the abdomen and pelvis with intravenous and oral   contrast. Axial, sagittal, and coronal images were obtained and reviewed.   90 cc of Optiray 350 was administered intravenously. 10 cc was discarded.    COMPARISON: None.    FINDINGS:    Lower chest: Clear lung bases. Coronary artery calcification versus   stents right greater than left. Probable mitral annulus calcification. No   cardiomegaly or pericardial effusion.    Liver: Smooth in contour. No focal lesion. Portal and hepatic veins are   patent. Mild periportal edema.    Biliary system: Post cholecystectomy with surgical clips in the right   upper quadrant. Prominence of the common bile duct consistent with prior   cholecystectomy. No gross pancreatic duct dilatation.    Pancreas: Poorly visualized and atrophic changes. There may be coarse   parenchymal calcifications to suggest chronic pancreatitis versus   calcifications within the splenic artery. Appendix is not seen.    Spleen: Unremarkable.    Adrenal glands: Unremarkable.    Kidneys: Symmetric parenchymal enhancement. No renal mass. No   hydronephrosis. No renal or ureteral stone.   Urinary Bladder: Markedly distended without wall thickening..    Reproductive organs: Unremarkable.     Bowel/Peritoneum: There is a contrast containing uncomplicated small   bowel inguinal hernia. There is no evidence of ischemic change or   obstruction.. No appreciable wall thickening. No ascites or extraluminal   gas.     Lymph nodes:No lymphadenopathy.    Aorta/IVC: Normal caliber. Heavy soft and calcified plaque abdominal   aorta and branch vessels. No significant aneurysm.    Abdominal wall: Uncomplicated large left inguinal hernia that contains   small bowel contents, unobstructed.    Bones/Soft tissues: Severe degenerative changes the spine was pronounced   at L2-L3 and L5-S1. There is a grade 1 retrolisthesis of L2 on L3.   Degenerative changes of the hips and sacroiliac joints. In the right   anterolateral abdominal wall inferior to the lowest rib there is a 2.3 x   2.6 x 2.5 cm rim-enhancing collection. This may represent a soft tissue   abscess series 9, image 34). This could be on a postsurgical basis. There   is subcutaneous gas along the anterior and lateral aspects of the   abdominal wall left greater than right.     IMPRESSION:     2.3 x 2.6 x 2.5 cm abscess collection in the right anterolateral   abdominal wall inferior to the lowest rib possibly the sequela of prior   surgery.    Uncomplicated large left inguinal hernia containing unobstructed loops of   small bowel.    No large drainable fluid collection in the gallbladder fossa with   postsurgical changes. Both intra and extrahepatic bile duct dilatation   compatible with prior cholecystectomy change.    Heavy diffuse soft tissue and calcified mural atheromatous plaque   dimension throughout the abdominal aorta and major side branches   narrowing of the origin of multiple major side branches without critical   stenosis.    Markedly distended urinary bladder with some fullness of both renal   collecting systems.            "Thank you for the opportunity to participate in the care of this   patient."    ANASTASIA MARIE M.D., RADIOLOGY RESIDENT  This document has been electronically signed.  JAC BUSTAMANTE M.D., ATTENDING RADIOLOGIST  This document has been electronically signed. Mar 16 2019  6:24PM                  < end of copied text >

## 2019-03-18 NOTE — DISCHARGE NOTE PROVIDER - HOSPITAL COURSE
Mr. Marquez is a 73 Year old M w/ PMH of CAD S/p stent, HTN, DM, S/p Lap tila on 2/26 for symptomatic gall stone, presented on the night of 3/15/19 with abdominal pain associated with nausea/vomiting.                 3/18: Endo consulted, GI reconsulted, ERCP pending     O/N: pending MRCP, med consulted recs: needs endo consult (uncontrolled sugars), podiatry consult (foot fungus) and ENT consult (difficulty swallowing the past 2 weeks), as per medicine pt has a macrocytic anemia that needs to be followed up, chief is aware and pt will follow out patient for that    3/17: medicine consulted for general medical care, diabetes management, pending MRCP. NPO at midnight for possible ERCP tomorrow, recommend IR aspiration of collection    o/n: hyperglycemic after dinner, gave home dose of 10U lantus, started q2 finger sticks and ISS. poorly controlled diabetes at home, FSS range from 300-500. gave 14U lispro overnight.    3/16: adv to low fat diet, tolerating. MRCP pending. Opened subq lump at lateral port site in RUQ, evacuated 2-3 cc of old blood. CT final read intra- and extrahepatic biliary ductal dilatation c/w cholecystectomy, no drainable fluid collection. +F/+BM. Mr. Marquez is a 73 Year old M w/ PMH of CAD S/p stent, HTN, DM, S/p Lap tila on 2/26 for symptomatic gall stone, presented on the night of 3/15/19 with abdominal pain associated with nausea/vomiting. Lateral RUQ port site was opened and 2-3cc of hematoma was evacuated. CT showed mild intra and extrahepatic ductal dilation with no drainable fluid collection. Patient's hospital stay was remarkable for elevated glucose levels and endocrine was consulted for further recommendations. Pt's symptoms resolved. On day of discharge patient was stable to be d/c'd home.

## 2019-03-19 NOTE — CHART NOTE - NSCHARTNOTEFT_GEN_A_CORE
PROCEDURE NOTE    Procedure: incision and drainage of hematoma  Resident: [place]  Attending: Sakshi    Patient positioned in supine position. Site prepped with chlorhexidine. #11 blade scalpel was used for 5mm incision to the right lateral port site over site of obvious swelling. Drained approximately 3-5cc of hematoma from site. No packing was placed. Dressed with 4x4 and paper tape. No complications.

## 2019-03-26 DIAGNOSIS — K40.90 UNILATERAL INGUINAL HERNIA, WITHOUT OBSTRUCTION OR GANGRENE, NOT SPECIFIED AS RECURRENT: ICD-10-CM
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
## 2019-03-26 DIAGNOSIS — R13.10 DYSPHAGIA, UNSPECIFIED: ICD-10-CM

## 2019-03-26 DIAGNOSIS — B35.1 TINEA UNGUIUM: ICD-10-CM

## 2019-03-26 DIAGNOSIS — E83.42 HYPOMAGNESEMIA: ICD-10-CM

## 2019-03-26 DIAGNOSIS — K83.8 OTHER SPECIFIED DISEASES OF BILIARY TRACT: ICD-10-CM
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
## 2019-03-26 DIAGNOSIS — Z95.5 PRESENCE OF CORONARY ANGIOPLASTY IMPLANT AND GRAFT: ICD-10-CM

## 2019-03-26 DIAGNOSIS — I10 ESSENTIAL (PRIMARY) HYPERTENSION: ICD-10-CM

## 2019-03-26 DIAGNOSIS — J43.9 EMPHYSEMA, UNSPECIFIED: ICD-10-CM

## 2019-03-26 DIAGNOSIS — R10.9 UNSPECIFIED ABDOMINAL PAIN: ICD-10-CM

## 2019-03-26 DIAGNOSIS — K21.9 GASTRO-ESOPHAGEAL REFLUX DISEASE WITHOUT ESOPHAGITIS: ICD-10-CM

## 2019-03-26 DIAGNOSIS — S30.1XXA CONTUSION OF ABDOMINAL WALL, INITIAL ENCOUNTER: ICD-10-CM

## 2019-03-26 DIAGNOSIS — E11.65 TYPE 2 DIABETES MELLITUS WITH HYPERGLYCEMIA: ICD-10-CM

## 2019-03-26 DIAGNOSIS — I25.10 ATHEROSCLEROTIC HEART DISEASE OF NATIVE CORONARY ARTERY WITHOUT ANGINA PECTORIS: ICD-10-CM

## 2019-03-26 DIAGNOSIS — K86.1 OTHER CHRONIC PANCREATITIS: ICD-10-CM

## 2019-03-26 DIAGNOSIS — E87.1 HYPO-OSMOLALITY AND HYPONATREMIA: ICD-10-CM

## 2019-03-26 DIAGNOSIS — D53.9 NUTRITIONAL ANEMIA, UNSPECIFIED: ICD-10-CM
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
